# Patient Record
Sex: MALE | Race: BLACK OR AFRICAN AMERICAN | NOT HISPANIC OR LATINO | Employment: OTHER | URBAN - METROPOLITAN AREA
[De-identification: names, ages, dates, MRNs, and addresses within clinical notes are randomized per-mention and may not be internally consistent; named-entity substitution may affect disease eponyms.]

---

## 2017-11-24 ENCOUNTER — HOSPITAL ENCOUNTER (INPATIENT)
Facility: HOSPITAL | Age: 82
LOS: 2 days | Discharge: HOME | DRG: 871 | End: 2017-11-26
Attending: EMERGENCY MEDICINE | Admitting: INTERNAL MEDICINE
Payer: MEDICARE

## 2017-11-24 DIAGNOSIS — R53.81 MALAISE: ICD-10-CM

## 2017-11-24 DIAGNOSIS — N12 PYELONEPHRITIS: ICD-10-CM

## 2017-11-24 DIAGNOSIS — N30.00 ACUTE CYSTITIS WITHOUT HEMATURIA: Primary | ICD-10-CM

## 2017-11-24 DIAGNOSIS — N28.9 RENAL INSUFFICIENCY: ICD-10-CM

## 2017-11-24 PROBLEM — I10 ESSENTIAL HYPERTENSION: Chronic | Status: ACTIVE | Noted: 2017-11-24

## 2017-11-24 PROBLEM — N17.9 ACUTE RENAL FAILURE: Status: ACTIVE | Noted: 2017-11-24

## 2017-11-24 PROBLEM — Z86.73 HISTORY OF CVA (CEREBROVASCULAR ACCIDENT): Chronic | Status: ACTIVE | Noted: 2017-11-24

## 2017-11-24 PROBLEM — A41.9 SEPSIS: Status: ACTIVE | Noted: 2017-11-24

## 2017-11-24 LAB
ALBUMIN SERPL BCP-MCNC: 3.3 G/DL
ALP SERPL-CCNC: 61 U/L
ALT SERPL W/O P-5'-P-CCNC: 23 U/L
ANION GAP SERPL CALC-SCNC: 10 MMOL/L
ANION GAP SERPL CALC-SCNC: 14 MMOL/L
AST SERPL-CCNC: 22 U/L
BACTERIA #/AREA URNS HPF: ABNORMAL /HPF
BASOPHILS # BLD AUTO: 0.02 K/UL
BASOPHILS NFR BLD: 0.1 %
BILIRUB SERPL-MCNC: 1.4 MG/DL
BILIRUB UR QL STRIP: NEGATIVE
BUN SERPL-MCNC: 56 MG/DL
BUN SERPL-MCNC: 62 MG/DL
CALCIUM SERPL-MCNC: 8.8 MG/DL
CALCIUM SERPL-MCNC: 9.6 MG/DL
CHLORIDE SERPL-SCNC: 100 MMOL/L
CHLORIDE SERPL-SCNC: 105 MMOL/L
CLARITY UR: ABNORMAL
CO2 SERPL-SCNC: 21 MMOL/L
CO2 SERPL-SCNC: 23 MMOL/L
COLOR UR: YELLOW
CREAT SERPL-MCNC: 2.6 MG/DL
CREAT SERPL-MCNC: 3.2 MG/DL
DIFFERENTIAL METHOD: ABNORMAL
EOSINOPHIL # BLD AUTO: 0.2 K/UL
EOSINOPHIL NFR BLD: 1.3 %
ERYTHROCYTE [DISTWIDTH] IN BLOOD BY AUTOMATED COUNT: 13.9 %
EST. GFR  (AFRICAN AMERICAN): 19 ML/MIN/1.73 M^2
EST. GFR  (AFRICAN AMERICAN): 24 ML/MIN/1.73 M^2
EST. GFR  (NON AFRICAN AMERICAN): 16 ML/MIN/1.73 M^2
EST. GFR  (NON AFRICAN AMERICAN): 21 ML/MIN/1.73 M^2
FLUAV AG SPEC QL IA: NEGATIVE
FLUBV AG SPEC QL IA: NEGATIVE
GLUCOSE SERPL-MCNC: 83 MG/DL
GLUCOSE SERPL-MCNC: 89 MG/DL
GLUCOSE UR QL STRIP: NEGATIVE
HCT VFR BLD AUTO: 39.8 %
HGB BLD-MCNC: 13.4 G/DL
HGB UR QL STRIP: ABNORMAL
KETONES UR QL STRIP: NEGATIVE
LACTATE SERPL-SCNC: 0.9 MMOL/L
LACTATE SERPL-SCNC: 1 MMOL/L
LACTATE SERPL-SCNC: 1 MMOL/L
LEUKOCYTE ESTERASE UR QL STRIP: ABNORMAL
LYMPHOCYTES # BLD AUTO: 1.5 K/UL
LYMPHOCYTES NFR BLD: 8.4 %
MAGNESIUM SERPL-MCNC: 2.3 MG/DL
MCH RBC QN AUTO: 31.6 PG
MCHC RBC AUTO-ENTMCNC: 33.7 G/DL
MCV RBC AUTO: 94 FL
MICROSCOPIC COMMENT: ABNORMAL
MONOCYTES # BLD AUTO: 0.8 K/UL
MONOCYTES NFR BLD: 4.5 %
NEUTROPHILS # BLD AUTO: 14.8 K/UL
NEUTROPHILS NFR BLD: 85.7 %
NITRITE UR QL STRIP: POSITIVE
PH UR STRIP: 6 [PH] (ref 5–8)
PHOSPHATE SERPL-MCNC: 3.6 MG/DL
PLATELET # BLD AUTO: 120 K/UL
PMV BLD AUTO: 9.8 FL
POTASSIUM SERPL-SCNC: 3.8 MMOL/L
POTASSIUM SERPL-SCNC: 3.9 MMOL/L
PROT SERPL-MCNC: 8.1 G/DL
PROT UR QL STRIP: ABNORMAL
RBC # BLD AUTO: 4.24 M/UL
RBC #/AREA URNS HPF: 80 /HPF (ref 0–4)
SODIUM SERPL-SCNC: 136 MMOL/L
SODIUM SERPL-SCNC: 137 MMOL/L
SP GR UR STRIP: 1.02 (ref 1–1.03)
SPECIMEN SOURCE: NORMAL
TSH SERPL DL<=0.005 MIU/L-ACNC: 3.05 UIU/ML
URN SPEC COLLECT METH UR: ABNORMAL
UROBILINOGEN UR STRIP-ACNC: NEGATIVE EU/DL
WBC # BLD AUTO: 17.31 K/UL
WBC #/AREA URNS HPF: 40 /HPF (ref 0–5)
WBC CLUMPS URNS QL MICRO: ABNORMAL

## 2017-11-24 PROCEDURE — 96361 HYDRATE IV INFUSION ADD-ON: CPT

## 2017-11-24 PROCEDURE — 87040 BLOOD CULTURE FOR BACTERIA: CPT | Mod: 59

## 2017-11-24 PROCEDURE — 80048 BASIC METABOLIC PNL TOTAL CA: CPT

## 2017-11-24 PROCEDURE — 93010 ELECTROCARDIOGRAM REPORT: CPT | Mod: ,,, | Performed by: INTERNAL MEDICINE

## 2017-11-24 PROCEDURE — 25000003 PHARM REV CODE 250: Performed by: NURSE PRACTITIONER

## 2017-11-24 PROCEDURE — 84443 ASSAY THYROID STIM HORMONE: CPT

## 2017-11-24 PROCEDURE — 87086 URINE CULTURE/COLONY COUNT: CPT

## 2017-11-24 PROCEDURE — 96372 THER/PROPH/DIAG INJ SC/IM: CPT

## 2017-11-24 PROCEDURE — 93005 ELECTROCARDIOGRAM TRACING: CPT

## 2017-11-24 PROCEDURE — 83605 ASSAY OF LACTIC ACID: CPT | Mod: 91

## 2017-11-24 PROCEDURE — 63600175 PHARM REV CODE 636 W HCPCS: Performed by: EMERGENCY MEDICINE

## 2017-11-24 PROCEDURE — 25000003 PHARM REV CODE 250: Performed by: EMERGENCY MEDICINE

## 2017-11-24 PROCEDURE — 81000 URINALYSIS NONAUTO W/SCOPE: CPT

## 2017-11-24 PROCEDURE — 25000003 PHARM REV CODE 250: Performed by: FAMILY MEDICINE

## 2017-11-24 PROCEDURE — 63600175 PHARM REV CODE 636 W HCPCS: Performed by: NURSE PRACTITIONER

## 2017-11-24 PROCEDURE — 83605 ASSAY OF LACTIC ACID: CPT

## 2017-11-24 PROCEDURE — 87400 INFLUENZA A/B EACH AG IA: CPT | Mod: 59

## 2017-11-24 PROCEDURE — 96365 THER/PROPH/DIAG IV INF INIT: CPT

## 2017-11-24 PROCEDURE — S0030 INJECTION, METRONIDAZOLE: HCPCS | Performed by: FAMILY MEDICINE

## 2017-11-24 PROCEDURE — 11000001 HC ACUTE MED/SURG PRIVATE ROOM

## 2017-11-24 PROCEDURE — 36415 COLL VENOUS BLD VENIPUNCTURE: CPT

## 2017-11-24 PROCEDURE — 99285 EMERGENCY DEPT VISIT HI MDM: CPT | Mod: 25

## 2017-11-24 PROCEDURE — 96375 TX/PRO/DX INJ NEW DRUG ADDON: CPT

## 2017-11-24 PROCEDURE — 85025 COMPLETE CBC W/AUTO DIFF WBC: CPT

## 2017-11-24 PROCEDURE — 83735 ASSAY OF MAGNESIUM: CPT

## 2017-11-24 PROCEDURE — 84100 ASSAY OF PHOSPHORUS: CPT

## 2017-11-24 PROCEDURE — 80053 COMPREHEN METABOLIC PANEL: CPT

## 2017-11-24 RX ORDER — ONDANSETRON 2 MG/ML
4 INJECTION INTRAMUSCULAR; INTRAVENOUS EVERY 6 HOURS PRN
Status: DISCONTINUED | OUTPATIENT
Start: 2017-11-24 | End: 2017-11-26 | Stop reason: HOSPADM

## 2017-11-24 RX ORDER — ACETAMINOPHEN 325 MG/1
650 TABLET ORAL EVERY 8 HOURS PRN
Status: DISCONTINUED | OUTPATIENT
Start: 2017-11-24 | End: 2017-11-26 | Stop reason: HOSPADM

## 2017-11-24 RX ORDER — QUETIAPINE FUMARATE 25 MG/1
25 TABLET, FILM COATED ORAL NIGHTLY
Status: DISCONTINUED | OUTPATIENT
Start: 2017-11-24 | End: 2017-11-26 | Stop reason: HOSPADM

## 2017-11-24 RX ORDER — METRONIDAZOLE 500 MG/100ML
500 INJECTION, SOLUTION INTRAVENOUS
Status: DISCONTINUED | OUTPATIENT
Start: 2017-11-24 | End: 2017-11-25

## 2017-11-24 RX ORDER — ASPIRIN 81 MG/1
81 TABLET ORAL DAILY
Status: DISCONTINUED | OUTPATIENT
Start: 2017-11-24 | End: 2017-11-26 | Stop reason: HOSPADM

## 2017-11-24 RX ORDER — ACETAMINOPHEN 325 MG/1
650 TABLET ORAL
Status: COMPLETED | OUTPATIENT
Start: 2017-11-24 | End: 2017-11-24

## 2017-11-24 RX ORDER — CEFTRIAXONE 1 G/1
1 INJECTION, POWDER, FOR SOLUTION INTRAMUSCULAR; INTRAVENOUS
Status: DISCONTINUED | OUTPATIENT
Start: 2017-11-24 | End: 2017-11-24 | Stop reason: RX

## 2017-11-24 RX ORDER — CIPROFLOXACIN 2 MG/ML
400 INJECTION, SOLUTION INTRAVENOUS
Status: COMPLETED | OUTPATIENT
Start: 2017-11-24 | End: 2017-11-24

## 2017-11-24 RX ORDER — PANTOPRAZOLE SODIUM 40 MG/1
40 TABLET, DELAYED RELEASE ORAL DAILY
Status: DISCONTINUED | OUTPATIENT
Start: 2017-11-24 | End: 2017-11-26 | Stop reason: HOSPADM

## 2017-11-24 RX ORDER — ATORVASTATIN CALCIUM 40 MG/1
40 TABLET, FILM COATED ORAL DAILY
Status: DISCONTINUED | OUTPATIENT
Start: 2017-11-24 | End: 2017-11-26 | Stop reason: HOSPADM

## 2017-11-24 RX ORDER — HEPARIN SODIUM 5000 [USP'U]/ML
5000 INJECTION, SOLUTION INTRAVENOUS; SUBCUTANEOUS EVERY 8 HOURS
Status: DISCONTINUED | OUTPATIENT
Start: 2017-11-24 | End: 2017-11-26 | Stop reason: HOSPADM

## 2017-11-24 RX ORDER — ENOXAPARIN SODIUM 100 MG/ML
30 INJECTION SUBCUTANEOUS EVERY 24 HOURS
Status: DISCONTINUED | OUTPATIENT
Start: 2017-11-24 | End: 2017-11-24

## 2017-11-24 RX ORDER — AMLODIPINE BESYLATE 5 MG/1
5 TABLET ORAL DAILY
Status: DISCONTINUED | OUTPATIENT
Start: 2017-11-24 | End: 2017-11-26 | Stop reason: HOSPADM

## 2017-11-24 RX ORDER — CIPROFLOXACIN 2 MG/ML
400 INJECTION, SOLUTION INTRAVENOUS
Status: CANCELLED | OUTPATIENT
Start: 2017-11-25

## 2017-11-24 RX ORDER — SODIUM CHLORIDE 9 MG/ML
INJECTION, SOLUTION INTRAVENOUS CONTINUOUS
Status: DISCONTINUED | OUTPATIENT
Start: 2017-11-24 | End: 2017-11-26 | Stop reason: HOSPADM

## 2017-11-24 RX ADMIN — SODIUM CHLORIDE: 0.9 INJECTION, SOLUTION INTRAVENOUS at 09:11

## 2017-11-24 RX ADMIN — CIPROFLOXACIN 400 MG: 2 INJECTION, SOLUTION INTRAVENOUS at 04:11

## 2017-11-24 RX ADMIN — PANTOPRAZOLE SODIUM 40 MG: 40 TABLET, DELAYED RELEASE ORAL at 09:11

## 2017-11-24 RX ADMIN — CEFTRIAXONE SODIUM 1 G: 1 INJECTION, POWDER, FOR SOLUTION INTRAMUSCULAR; INTRAVENOUS at 06:11

## 2017-11-24 RX ADMIN — HEPARIN SODIUM 5000 UNITS: 5000 INJECTION, SOLUTION INTRAVENOUS; SUBCUTANEOUS at 10:11

## 2017-11-24 RX ADMIN — SODIUM CHLORIDE 1000 ML: 0.9 INJECTION, SOLUTION INTRAVENOUS at 03:11

## 2017-11-24 RX ADMIN — ASPIRIN 81 MG: 81 TABLET, COATED ORAL at 09:11

## 2017-11-24 RX ADMIN — AMLODIPINE BESYLATE 5 MG: 5 TABLET ORAL at 11:11

## 2017-11-24 RX ADMIN — SODIUM CHLORIDE 1000 ML: 0.9 INJECTION, SOLUTION INTRAVENOUS at 06:11

## 2017-11-24 RX ADMIN — ATORVASTATIN CALCIUM 40 MG: 40 TABLET, FILM COATED ORAL at 09:11

## 2017-11-24 RX ADMIN — HEPARIN SODIUM 5000 UNITS: 5000 INJECTION, SOLUTION INTRAVENOUS; SUBCUTANEOUS at 02:11

## 2017-11-24 RX ADMIN — ACETAMINOPHEN 650 MG: 325 TABLET ORAL at 03:11

## 2017-11-24 RX ADMIN — QUETIAPINE FUMARATE 25 MG: 25 TABLET, FILM COATED ORAL at 07:11

## 2017-11-24 RX ADMIN — METRONIDAZOLE 500 MG: 500 INJECTION, SOLUTION INTRAVENOUS at 05:11

## 2017-11-24 NOTE — PLAN OF CARE
Problem: Patient Care Overview  Goal: Plan of Care Review  Outcome: Ongoing (interventions implemented as appropriate)  Patient remains free from injury. Denies pain. Patient turning and repositioning in bed. Sitter at the bedside. Fluids being administered as ordered. 12 hour chart check complete. Will continue to monitor.

## 2017-11-24 NOTE — ED PROVIDER NOTES
SCRIBE #1 NOTE: I, Pedro Fenton, am scribing for, and in the presence of, Brent Middleton MD. I have scribed the entire note.      History      Chief Complaint   Patient presents with    Altered Mental Status     transfer from [facility],        Review of patient's allergies indicates:  No Known Allergies     HPI   HPI    11/24/2017, 2:40 AM   History obtained from the Transferring hospital      History of Present Illness: Guido Michaels is a 88 y.o. male patient who presents to the Emergency Department for an evaluation of AMS. Pt was transferred from [facility] to evaluated for his garbled speech and gait problem by a neurologist. Symptoms are constant and moderate in severity. Exacerbated by nothing and relieved by nothing. Associated sxs include confusion. Pt  Reportedly has a hx of dementia.       Arrival mode:  EMS     PCP: Vinicio Walter MD (Inactive)       Past Medical History:  Past Medical History:   Diagnosis Date    Dementia     Diastolic dysfunction without heart failure 2014    History of ischemic stroke without residual deficits 2014    s/p tPA therapy    Hyperlipidemia     Hypertension        Past Surgical History:  Past surgical history reviewed not relevant      Family History:  Family history reviewed not relevant      Social History:  Social History    Social History Main Topics    Social History Main Topics    Smoking status: Unknown if ever smoked    Smokeless tobacco: Unknown if ever used    Alcohol Use: Unknown drinking history    Drug Use: Unknown if ever used    Sexual Activity: Unknown         ROS   Review of Systems   Unable to perform ROS: Dementia   Psychiatric/Behavioral: Positive for confusion.     Physical Exam      Initial Vitals   BP Pulse Resp Temp SpO2   -- -- -- -- --      MAP       --          Physical Exam  Nursing Notes and Vital Signs Reviewed.  Constitutional: Patient is in no acute distress. Well-developed and well-nourished.  Head: Atraumatic.  "Normocephalic.  Eyes: PERRL. EOM intact. Conjunctivae are not pale. No scleral icterus.  ENT: Mucous membranes are moist. Oropharynx is clear and symmetric.    Neck: Supple. Full ROM. No lymphadenopathy.  Cardiovascular: Regular rate. Regular rhythm.  Pulmonary/Chest: No respiratory distress. Clear to auscultation bilaterally. No wheezing or rales.  Abdominal: Soft and non-distended.   Musculoskeletal: Moves all extremities. No obvious deformities.  Skin: Warm and dry.  Neurological:  Alert, awake, and appropriate. Normal speech. Confused. Disoriented.   Psychiatric: Normal affect. Good eye contact. Appropriate in content.    ED Course    Procedures  ED Vital Signs:  Vitals:    11/24/17 0240 11/24/17 0302 11/24/17 0333 11/24/17 0454   BP: (!) 140/87 (!) 157/89 132/73 136/75   Pulse: 80 84 87 83   Resp: 18 19 20 18   Temp: (!) 100.7 °F (38.2 °C)   99.1 °F (37.3 °C)   TempSrc: Oral      SpO2: 98% 98% 98% 96%   Weight: 71 kg (156 lb 9.6 oz)      Height: 5' 9" (1.753 m)       11/24/17 0532 11/24/17 0730 11/24/17 0751 11/24/17 1219   BP: 137/79 (!) 150/91 (!) 150/91 (!) 178/86   Pulse: 81 73 73 86   Resp: 18 16 18   Temp:   99.7 °F (37.6 °C) 99.1 °F (37.3 °C)   TempSrc:   Oral Oral   SpO2: 100% 98%  97%   Weight:       Height:        11/24/17 2009 11/25/17 0035 11/25/17 0349   BP: (!) 181/92 (!) 160/99 (!) 173/99   Pulse: 73 (!) 55 79   Resp: 18 16 18   Temp: 98.5 °F (36.9 °C) 97.8 °F (36.6 °C) 97.8 °F (36.6 °C)   TempSrc:  Axillary Axillary   SpO2: 97% 98% 99%   Weight:      Height:          Abnormal Lab Results:  Labs Reviewed   CBC W/ AUTO DIFFERENTIAL - Abnormal; Notable for the following:        Result Value    WBC 17.31 (*)     RBC 4.24 (*)     Hemoglobin 13.4 (*)     Hematocrit 39.8 (*)     MCH 31.6 (*)     Platelets 120 (*)     Gran # 14.8 (*)     Gran% 85.7 (*)     Lymph% 8.4 (*)     All other components within normal limits   COMPREHENSIVE METABOLIC PANEL - Abnormal; Notable for the following:     BUN, Bld 62 " (*)     Creatinine 3.2 (*)     Albumin 3.3 (*)     Total Bilirubin 1.4 (*)     eGFR if  19 (*)     eGFR if non  16 (*)     All other components within normal limits   URINALYSIS - Abnormal; Notable for the following:     Appearance, UA Cloudy (*)     Protein, UA Trace (*)     Occult Blood UA 3+ (*)     Nitrite, UA Positive (*)     Leukocytes, UA 1+ (*)     All other components within normal limits   URINALYSIS MICROSCOPIC - Abnormal; Notable for the following:     RBC, UA 80 (*)     WBC, UA 40 (*)     WBC Clumps, UA Occasional (*)     Bacteria, UA Many (*)     All other components within normal limits   CULTURE, URINE   TSH   MAGNESIUM   PHOSPHORUS   INFLUENZA A AND B ANTIGEN   LACTIC ACID, PLASMA   LACTIC ACID, PLASMA        All Lab Results:  Results for orders placed or performed during the hospital encounter of 11/24/17   Blood culture   Result Value Ref Range    Blood Culture, Routine No Growth to date    Blood culture   Result Value Ref Range    Blood Culture, Routine No Growth to date    CBC auto differential   Result Value Ref Range    WBC 17.31 (H) 3.90 - 12.70 K/uL    RBC 4.24 (L) 4.60 - 6.20 M/uL    Hemoglobin 13.4 (L) 14.0 - 18.0 g/dL    Hematocrit 39.8 (L) 40.0 - 54.0 %    MCV 94 82 - 98 fL    MCH 31.6 (H) 27.0 - 31.0 pg    MCHC 33.7 32.0 - 36.0 g/dL    RDW 13.9 11.5 - 14.5 %    Platelets 120 (L) 150 - 350 K/uL    MPV 9.8 9.2 - 12.9 fL    Gran # 14.8 (H) 1.8 - 7.7 K/uL    Lymph # 1.5 1.0 - 4.8 K/uL    Mono # 0.8 0.3 - 1.0 K/uL    Eos # 0.2 0.0 - 0.5 K/uL    Baso # 0.02 0.00 - 0.20 K/uL    Gran% 85.7 (H) 38.0 - 73.0 %    Lymph% 8.4 (L) 18.0 - 48.0 %    Mono% 4.5 4.0 - 15.0 %    Eosinophil% 1.3 0.0 - 8.0 %    Basophil% 0.1 0.0 - 1.9 %    Differential Method Automated    Comprehensive metabolic panel   Result Value Ref Range    Sodium 137 136 - 145 mmol/L    Potassium 3.8 3.5 - 5.1 mmol/L    Chloride 100 95 - 110 mmol/L    CO2 23 23 - 29 mmol/L    Glucose 89 70 - 110 mg/dL     BUN, Bld 62 (H) 8 - 23 mg/dL    Creatinine 3.2 (H) 0.5 - 1.4 mg/dL    Calcium 9.6 8.7 - 10.5 mg/dL    Total Protein 8.1 6.0 - 8.4 g/dL    Albumin 3.3 (L) 3.5 - 5.2 g/dL    Total Bilirubin 1.4 (H) 0.1 - 1.0 mg/dL    Alkaline Phosphatase 61 55 - 135 U/L    AST 22 10 - 40 U/L    ALT 23 10 - 44 U/L    Anion Gap 14 8 - 16 mmol/L    eGFR if African American 19 (A) >60 mL/min/1.73 m^2    eGFR if non African American 16 (A) >60 mL/min/1.73 m^2   Urinalysis   Result Value Ref Range    Specimen UA Urine, Catheterized     Color, UA Yellow Yellow, Straw, Carissa    Appearance, UA Cloudy (A) Clear    pH, UA 6.0 5.0 - 8.0    Specific Gravity, UA 1.020 1.005 - 1.030    Protein, UA Trace (A) Negative    Glucose, UA Negative Negative    Ketones, UA Negative Negative    Bilirubin (UA) Negative Negative    Occult Blood UA 3+ (A) Negative    Nitrite, UA Positive (A) Negative    Urobilinogen, UA Negative <2.0 EU/dL    Leukocytes, UA 1+ (A) Negative   TSH   Result Value Ref Range    TSH 3.054 0.400 - 4.000 uIU/mL   Magnesium   Result Value Ref Range    Magnesium 2.3 1.6 - 2.6 mg/dL   Phosphorus   Result Value Ref Range    Phosphorus 3.6 2.7 - 4.5 mg/dL   Influenza antigen Nasopharyngeal Swab   Result Value Ref Range    Influenza A Ag, EIA Negative Negative    Influenza B Ag, EIA Negative Negative    Flu A & B Source Nasopharyngeal Swab    Lactic acid, plasma   Result Value Ref Range    Lactate (Lactic Acid) 1.0 0.5 - 2.2 mmol/L   Urinalysis Microscopic   Result Value Ref Range    RBC, UA 80 (H) 0 - 4 /hpf    WBC, UA 40 (H) 0 - 5 /hpf    WBC Clumps, UA Occasional (A) None-Rare    Bacteria, UA Many (A) None-Occ /hpf    Microscopic Comment SEE COMMENT    Lactic acid, plasma   Result Value Ref Range    Lactate (Lactic Acid) 1.0 0.5 - 2.2 mmol/L   Lactic acid, plasma   Result Value Ref Range    Lactate (Lactic Acid) 0.9 0.5 - 2.2 mmol/L   Basic metabolic panel   Result Value Ref Range    Sodium 136 136 - 145 mmol/L    Potassium 3.9 3.5 - 5.1  mmol/L    Chloride 105 95 - 110 mmol/L    CO2 21 (L) 23 - 29 mmol/L    Glucose 83 70 - 110 mg/dL    BUN, Bld 56 (H) 8 - 23 mg/dL    Creatinine 2.6 (H) 0.5 - 1.4 mg/dL    Calcium 8.8 8.7 - 10.5 mg/dL    Anion Gap 10 8 - 16 mmol/L    eGFR if African American 24 (A) >60 mL/min/1.73 m^2    eGFR if non African American 21 (A) >60 mL/min/1.73 m^2         Imaging Results:  Imaging Results          US Retroperitoneal Complete (Kidney and (Final result)  Result time 11/24/17 09:37:47    Final result by Samuel Grant MD (11/24/17 09:37:47)                 Impression:     Mild right-sided hydronephrosis. Bilateral cystic kidney disease.          Electronically signed by: SAMUEL GRANT MD  Date:     11/24/17  Time:    09:37              Narrative:    Comparison:  6/20/14    History:  Acute kidney injury    Findings:    The kidneys are normal in size bilaterally measuring 9.3 cm on the right and 11.2 cm on the left. Mild right-sided hydronephrosis. Bilateral renal cysts are noted in the largest within the lower pole the right kidney measuring up to 6.7 cm. Largest cyst within the left kidney measures 1.5 cm. There is no evidence of nephrolithiasis. Adequate profusion is noted. Limited images of the urinary bladder are unremarkable.                             CT Head Without Contrast (Final result)  Result time 11/24/17 07:38:32    Final result by dEward Garzon III, MD (11/24/17 07:38:32)                 Impression:       1.  No acute intracranial disease identified.    2.  Age related atrophy.  Extensive chronic microvascular ischemic change.  Stable small chronic lacunar infarct in the left cerebellum.        Electronically signed by: EDWARD GARZON MD  Date:     11/24/17  Time:    07:38              Narrative:    Head CT without contrast    Clinical history: Altered mental status.  History of dementia.  r/o CVA    TECHNIQUE: Routine noncontrast axial head CT. All CT scans at this facility use dose modulation, iterative  reconstruction, and/or weight based dosing when appropriate to reduce radiation dose to as low as reasonably achievable.    Comparison: MRI 06/19/2014    FINDINGS: There is persistent diffuse age-related atrophy with commensurate expansion of the ventricles.  Atherosclerotic calcifications of the intracranial arteries are noted. There is persistent moderate to severe bihemispheric white matter hypodensity, most consistent with chronic microvascular ischemic change.  Small chronic lacunar infarct of the left cerebellum is again noted.  There is no CT evidence of acute cortical ischemia, intracranial hemorrhage, mass-effect,  obstructive hydrocephalus or other acute disease. The visualized paranasal sinuses and mastoid air cells are clear. No calvarial fracture is identified.                             X-Ray Chest 1 View (Final result)  Result time 11/24/17 07:35:03    Final result by Samuel Grant MD (11/24/17 07:35:03)                 Impression:     Atelectasis or airspace disease versus aspiration left lower lobe.       Electronically signed by: SAMUEL GRANT MD  Date:     11/24/17  Time:    07:35              Narrative:    Clinical Data:50    Comparison:  none    Findings:  Single view of the chest.      Cardiac silhouette is normal.  Atelectasis or airspace disease versus aspiration left lower lobe. Trace bilateral pleural effusions are not excluded..  No evidence of pneumothorax.  Bones demonstrate degenerative changes.                                  The EKG was ordered, reviewed, and independently interpreted by the ED provider.  Interpretation time: 3:17  Rate: 82 BPM  Rhythm: Sinus rhythm with 1st degree AV block  Interpretation: Minimal voltage criteria for LVH. Septal infarct. No STEMI.    The Emergency Provider reviewed the vital signs and test results, which are outlined above.    ED Discussion     4:39 AM: Discussed case with Dr. Santos ([facility]). Dr. Santos agrees with current care and  management of pt and accepts admission.   Admitting Service: Hospital medicine   Admitting Physician: Dr. Santos  Admit to: Med/surg    4:39 AM: Re-evaluated pt. I have discussed test results, shared treatment plan, and the need for admission with patient. Pt expresses understanding at this time and agree with all information. All questions answered. Pt has no further questions or concerns at this time. Pt is ready for admit.      ED Medication(s):  Medications   aspirin EC tablet 81 mg (81 mg Oral Given 11/24/17 0907)   atorvastatin tablet 40 mg (40 mg Oral Given 11/24/17 0907)   0.9%  NaCl infusion ( Intravenous New Bag 11/24/17 0907)   acetaminophen tablet 650 mg (not administered)   ondansetron injection 4 mg (not administered)   pantoprazole EC tablet 40 mg (40 mg Oral Given 11/24/17 0907)   cefTRIAXone (ROCEPHIN) 1 g in dextrose 5 % 50 mL IVPB (not administered)   heparin (porcine) injection 5,000 Units (5,000 Units Subcutaneous Given 11/24/17 2212)   metronidazole IVPB 500 mg (500 mg Intravenous New Bag 11/25/17 0307)   pneumoc 13-ivelisse conj-dip cr(PF) 0.5 mL (not administered)   influenza (FLUZONE HIGH-DOSE) vaccine 0.5 mL (not administered)   QUEtiapine tablet 25 mg (25 mg Oral Given 11/24/17 1901)   amLODIPine tablet 5 mg (5 mg Oral Given 11/24/17 2359)   hydrALAZINE injection 10 mg (not administered)   sodium chloride 0.9% bolus 1,000 mL (0 mLs Intravenous Stopped 11/24/17 0539)   acetaminophen tablet 650 mg (650 mg Oral Given 11/24/17 0331)   ciprofloxacin (CIPRO)400mg/200ml D5W IVPB 400 mg (0 mg Intravenous Stopped 11/24/17 0505)   sodium chloride 0.9% bolus 1,000 mL (1,000 mLs Intravenous New Bag 11/24/17 0600)       Current Discharge Medication List                Medical Decision Making    Medical Decision Making:   Clinical Tests:   Lab Tests: Ordered and Reviewed  Radiological Study: Ordered and Reviewed  Medical Tests: Ordered and Reviewed           Scribe Attestation:   Scribe #1: I performed the  above scribed service and the documentation accurately describes the services I performed. I attest to the accuracy of the note.    Attending:   Physician Attestation Statement for Scribe #1: I, Brent Middleton,*, personally performed the services described in this documentation, as scribed by Pedro Fenton, in my presence, and it is both accurate and complete.          Clinical Impression       ICD-10-CM ICD-9-CM   1. Pyelonephritis N12 590.80   2. Malaise R53.81 780.79   3. Renal insufficiency N28.9 593.9       Disposition:   Disposition: Admitted  Condition: Fair         Brent Middleton MD  11/25/17 0457

## 2017-11-24 NOTE — PLAN OF CARE
D/C assessment initiated with pt, but information given limited due to pt hx of dementia.  The pt was sent from [facility] for further treatment and evaluation due to altered mental status.  During assessment pt reports that he lives at home with his spouse, Kay Michaels, who provides assistance as needed.  He denies using any hme, The pt believes he came to the hospital for stomach pain.  CM attempted to contact the pt's family member listed on demographics, but no one answered the phone.  CM to continue to follow pt and assess for d/c needs.        11/24/17 1252   Discharge Assessment   Assessment Type Discharge Planning Assessment   Confirmed/corrected address and phone number on facesheet? Yes   Assessment information obtained from? Patient;Medical Record   Expected Length of Stay (days) (TBD)   Prior to hospitilization cognitive status: Unable to Assess   Prior to hospitalization functional status: Independent   Current cognitive status: Not Oriented to Person   Current Functional Status: Independent   Lives With spouse   Able to Return to Prior Arrangements yes   Is patient able to care for self after discharge? Yes   Who are your caregiver(s) and their phone number(s)? Makenzie Skelton, grandchild: [phone]   Patient's perception of discharge disposition home or selfcare   Readmission Within The Last 30 Days no previous admission in last 30 days   Patient currently being followed by outpatient case management? No   Patient currently receives any other outside agency services? No   Equipment Currently Used at Home none   Do you have any problems affording any of your prescribed medications? TBD   Does the patient have transportation home? Yes   Transportation Available family or friend will provide   Discharge Plan A Home with family   Discharge Plan B Home with family   Patient/Family In Agreement With Plan yes

## 2017-11-24 NOTE — ASSESSMENT & PLAN NOTE
- Likely due to above.  - Neuro checks.  - CT head to r/o acute CVA given documented c/o slurred speech.  No focal deficits appreciated currently.

## 2017-11-24 NOTE — H&P
"[facility] Hospital Medicine  History & Physical    Patient Name: Guido Michaels  MRN: [id number]  Admission Date: 11/24/2017  Attending Physician: Raad Santos MD   Primary Care Provider: Vinicio Walter MD         Patient information was obtained from past medical records, [facility] ER records and ER records.     Subjective:     Principal Problem:Pyelonephritis    Chief Complaint:   Chief Complaint   Patient presents with    Altered Mental Status     transfer from [facility],         HPI: History obtained from medical record due to patient's dementia.  Mr. Michaels is an 89yo male  with a PMHx of HTN, dementia, and h/o ischemic CVA without deficits in 2014, who originally presented to [facility] ED with c/o AMS.  Per family, patient is more confused than baseline with associated "inconprehenible speech, gait problem, and generalized weakness.  Patient was transferred to [facility] ED for higher level of care.  Initial work-up in ED resulted WBC 17.3, cr. 3.2, BUN 62, lactic 1, UA consistent with UTI.  Patient hemodynamically stable; Temp 100.7.  Hospital Medicine consulted for admission.  Patient has no documented h/o kidney disease; Last documented cr. of 1.1 in 2014.  Currently, patient is awake and alert, oriented to person.  Denies any complaints.  No slurred speech or weakness/hemiparesis appreciated.         Past Medical History:   Diagnosis Date    Dementia     Diastolic dysfunction without heart failure 2014    History of ischemic stroke without residual deficits 2014    s/p tPA therapy    Hyperlipidemia     Hypertension        Past Surgical History:   Procedure Laterality Date    APPENDECTOMY      HERNIA REPAIR         Review of patient's allergies indicates:  No Known Allergies    No current facility-administered medications on file prior to encounter.      Current Outpatient Prescriptions on File Prior to Encounter   Medication Sig    aspirin (ECOTRIN) 81 MG EC tablet Take 1 tablet (81 mg total) by " mouth once daily.    atorvastatin (LIPITOR) 40 MG tablet Take 1 tablet (40 mg total) by mouth once daily.    hydrochlorothiazide (HYDRODIURIL) 25 MG tablet Take 1 tablet (25 mg total) by mouth once daily.    lisinopril (PRINIVIL,ZESTRIL) 20 MG tablet Take 1 tablet (20 mg total) by mouth once daily.     Family History     Reviewed and not pertinent.         Social History Main Topics    Smoking status: Never Smoker    Smokeless tobacco: Never Used    Alcohol use No    Drug use: No    Sexual activity: Not on file     Review of Systems   Unable to perform ROS: Mental status change     Objective:     Vital Signs (Most Recent):  Temp: 99.1 °F (37.3 °C) (11/24/17 0454)  Pulse: 81 (11/24/17 0532)  Resp: 18 (11/24/17 0532)  BP: 137/79 (11/24/17 0532)  SpO2: 100 % (11/24/17 0532) Vital Signs (24h Range):  Temp:  [99.1 °F (37.3 °C)-100.7 °F (38.2 °C)] 99.1 °F (37.3 °C)  Pulse:  [80-87] 81  Resp:  [18-20] 18  SpO2:  [96 %-100 %] 100 %  BP: (132-157)/(73-89) 137/79     Weight: 71 kg (156 lb 9.6 oz)  Body mass index is 23.13 kg/m².    Physical Exam   Constitutional: Vital signs are normal. He appears well-developed. He is cooperative.   Elderly, frail.   HENT:   Head: Normocephalic and atraumatic.   Eyes: Conjunctivae and EOM are normal. Pupils are equal, round, and reactive to light.   Neck: Normal range of motion. Neck supple. No JVD present.   Cardiovascular: Normal rate, regular rhythm, S1 normal, S2 normal, intact distal pulses and normal pulses.   No extrasystoles are present. Exam reveals no gallop.    No murmur heard.  Pulses:       Radial pulses are 2+ on the right side, and 2+ on the left side.        Dorsalis pedis pulses are 2+ on the right side, and 2+ on the left side.        Posterior tibial pulses are 2+ on the right side, and 2+ on the left side.   Pulmonary/Chest: Effort normal and breath sounds normal. No accessory muscle usage. No tachypnea. No respiratory distress. He has no wheezes. He has no  rhonchi. He has no rales.   Abdominal: Soft. Normal appearance and bowel sounds are normal. He exhibits no distension. There is no tenderness. There is no rebound, no guarding and no CVA tenderness.   Musculoskeletal: Normal range of motion. He exhibits no edema, tenderness or deformity.   Neurological: He is alert. He is disoriented. GCS eye subscore is 4. GCS verbal subscore is 4. GCS motor subscore is 6.   No focal deficits.   Skin: Skin is warm and dry. Capillary refill takes 2 to 3 seconds. No rash noted. No erythema.   Poor skin turgor.    Psychiatric: He has a normal mood and affect. His speech is normal. Cognition and memory are impaired (dementia).   Nursing note and vitals reviewed.    Tissue Perfusion Assessment  Vital signs reviewed. A focused perfusion assessment was completed.         Significant Labs:   Results for orders placed or performed during the hospital encounter of 11/24/17   CBC auto differential   Result Value Ref Range    WBC 17.31 (H) 3.90 - 12.70 K/uL    RBC 4.24 (L) 4.60 - 6.20 M/uL    Hemoglobin 13.4 (L) 14.0 - 18.0 g/dL    Hematocrit 39.8 (L) 40.0 - 54.0 %    MCV 94 82 - 98 fL    MCH 31.6 (H) 27.0 - 31.0 pg    MCHC 33.7 32.0 - 36.0 g/dL    RDW 13.9 11.5 - 14.5 %    Platelets 120 (L) 150 - 350 K/uL    MPV 9.8 9.2 - 12.9 fL    Gran # 14.8 (H) 1.8 - 7.7 K/uL    Lymph # 1.5 1.0 - 4.8 K/uL    Mono # 0.8 0.3 - 1.0 K/uL    Eos # 0.2 0.0 - 0.5 K/uL    Baso # 0.02 0.00 - 0.20 K/uL    Gran% 85.7 (H) 38.0 - 73.0 %    Lymph% 8.4 (L) 18.0 - 48.0 %    Mono% 4.5 4.0 - 15.0 %    Eosinophil% 1.3 0.0 - 8.0 %    Basophil% 0.1 0.0 - 1.9 %    Differential Method Automated    Comprehensive metabolic panel   Result Value Ref Range    Sodium 137 136 - 145 mmol/L    Potassium 3.8 3.5 - 5.1 mmol/L    Chloride 100 95 - 110 mmol/L    CO2 23 23 - 29 mmol/L    Glucose 89 70 - 110 mg/dL    BUN, Bld 62 (H) 8 - 23 mg/dL    Creatinine 3.2 (H) 0.5 - 1.4 mg/dL    Calcium 9.6 8.7 - 10.5 mg/dL    Total Protein 8.1 6.0 -  8.4 g/dL    Albumin 3.3 (L) 3.5 - 5.2 g/dL    Total Bilirubin 1.4 (H) 0.1 - 1.0 mg/dL    Alkaline Phosphatase 61 55 - 135 U/L    AST 22 10 - 40 U/L    ALT 23 10 - 44 U/L    Anion Gap 14 8 - 16 mmol/L    eGFR if African American 19 (A) >60 mL/min/1.73 m^2    eGFR if non African American 16 (A) >60 mL/min/1.73 m^2   Urinalysis   Result Value Ref Range    Specimen UA Urine, Catheterized     Color, UA Yellow Yellow, Straw, Carissa    Appearance, UA Cloudy (A) Clear    pH, UA 6.0 5.0 - 8.0    Specific Gravity, UA 1.020 1.005 - 1.030    Protein, UA Trace (A) Negative    Glucose, UA Negative Negative    Ketones, UA Negative Negative    Bilirubin (UA) Negative Negative    Occult Blood UA 3+ (A) Negative    Nitrite, UA Positive (A) Negative    Urobilinogen, UA Negative <2.0 EU/dL    Leukocytes, UA 1+ (A) Negative   TSH   Result Value Ref Range    TSH 3.054 0.400 - 4.000 uIU/mL   Magnesium   Result Value Ref Range    Magnesium 2.3 1.6 - 2.6 mg/dL   Phosphorus   Result Value Ref Range    Phosphorus 3.6 2.7 - 4.5 mg/dL   Influenza antigen Nasopharyngeal Swab   Result Value Ref Range    Influenza A Ag, EIA Negative Negative    Influenza B Ag, EIA Negative Negative    Flu A & B Source Nasopharyngeal Swab    Lactic acid, plasma   Result Value Ref Range    Lactate (Lactic Acid) 1.0 0.5 - 2.2 mmol/L   Urinalysis Microscopic   Result Value Ref Range    RBC, UA 80 (H) 0 - 4 /hpf    WBC, UA 40 (H) 0 - 5 /hpf    WBC Clumps, UA Occasional (A) None-Rare    Bacteria, UA Many (A) None-Occ /hpf    Microscopic Comment SEE COMMENT       All pertinent labs within the past 24 hours have been reviewed.    Significant Imaging:   Imaging Results          X-Ray Chest 1 View (Preliminary result)  Result time 11/24/17 03:53:15    ED Interpretation by Brent Middleton MD (11/24/17 03:53:15, [facility], Emergency Medicine)    No acute finding                             I have reviewed all pertinent imaging results/findings within  the past 24 hours.     EKG: (personally reviewed)  Sinus rhythm with 1st degree AV block, LVH.  No acute ischemic ST-T abnormalities.  No previous to compare.        Assessment/Plan:     * Pyelonephritis    - Urine culture pending.  - Empiric abx with IV Rocephin.  - Aggressive IVF's.  - Strict I&O's.        Acute renal failure    - Likely due to above + IVVD + ACEi/HCTZ.  - Will get renal U/S.  - Aggressive IV hydration with 0.9% NS @ 125 mL/hr.  - Hold home ACEi and HCTZ.  - Avoid nephrotoxic agents.  - Strict I&O's.  - Follow kidney function closely.  Will consult Renal if needed.        Sepsis    - Likely due to above.  - Patient remains hemodynamically stable; Lactic 1.  - IV fluid resuscitation initiated in ED.  - Continuous IVF's with 0.9% NS @ 125 mL/hr.  - Blood and urine cultures pending.  - Empiric IV abx as above.  - Follow serial lactic; Daily labs.  - Antipyretics and antiemetics PRN.        Encephalopathy, metabolic    - Likely due to above.  - Neuro checks.  - CT head to r/o acute CVA given documented c/o slurred speech.  No focal deficits appreciated currently.        History of ischemic stroke without residual deficits    - Resume home ASA and statin.        Essential hypertension    - Hold home ACEi and HCTZ due to NICOLE.  - Will hold off on adding antihypertensives for now to prevent hypotension given sepsis.  Monitor BP trends, and add if needed.          VTE Risk Mitigation     Lovenox and SCD's.             RADHA Coyle  Department of Hospital Medicine   [facility]

## 2017-11-24 NOTE — ASSESSMENT & PLAN NOTE
- Likely due to above.  - Patient remains hemodynamically stable; Lactic 1.  - IV fluid resuscitation initiated in ED.  - Continuous IVF's with 0.9% NS @ 125 mL/hr.  - Blood and urine cultures pending.  - Empiric IV abx as above.  - Follow serial lactic; Daily labs.  - Antipyretics and antiemetics PRN.

## 2017-11-24 NOTE — ED NOTES
In and out cath performed per order to obtain urine sample. Sterile technique used Pt tolerated well.

## 2017-11-24 NOTE — PROGRESS NOTES
Pt admitted from [facility] at 6 AM this morning.    He is being treated/evaluated for metabolic encephalopathy, sepsis, NICOLE and Pyelonephritis. Pt has a hx of Dementia.    There is a sitter present in the room    Pt is awake and sitting on side of bed. His speech is clear and disoriented to person only. He has no focal deficits and sitter states he has ambulated to the bathroom.     Vital signs are stable, IV fluids and IV Rocephin in progress. Renal US indicated mild right sided hydronephrosis and bilateral cystic kidney disease. Lactic acid is normal. Blood cultures and urine culture pending.    Pt reports intermitted abdominal pain, denies back pain and hematuria.     Physical Exam   Constitutional: He appears well-developed.   HENT:   Head: Normocephalic and atraumatic.   Eyes: Conjunctivae are normal. No scleral icterus.   Neck: Normal range of motion. Neck supple.   Cardiovascular: Normal rate, regular rhythm and normal heart sounds.  Exam reveals no gallop and no friction rub.    No murmur heard.  Pulmonary/Chest: Effort normal and breath sounds normal.   Abdominal: Soft. Bowel sounds are normal.   Musculoskeletal: Normal range of motion. He exhibits no edema or tenderness.   Neurological: He is alert. He is disoriented.   Skin: Skin is warm and dry.   Psychiatric: He has a normal mood and affect. His behavior is normal.   Nursing note and vitals reviewed.

## 2017-11-24 NOTE — ED NOTES
Spoke to pt's wife Teresa Michaels per pt's request; states she is unable to come to the hospital at this time as she does not drive out of town.  Pt's wife updated on room assignment.  Pt's wife states she is trying to find a family member who can come be with the pt in the hospital at this time.

## 2017-11-24 NOTE — ASSESSMENT & PLAN NOTE
- Likely due to above + IVVD + ACEi/HCTZ.  - Will get renal U/S.  - Aggressive IV hydration with 0.9% NS @ 125 mL/hr.  - Hold home ACEi and HCTZ.  - Avoid nephrotoxic agents.  - Strict I&O's.  - Follow kidney function closely.  Will consult Renal if needed.

## 2017-11-24 NOTE — HPI
"History obtained from medical record due to patient's dementia.  Mr. Michaels is an 89yo male with a PMHx of HTN, dementia, and h/o ischemic CVA without deficits in 2014, who originally presented to [facility] ED with c/o AMS.  Per family, patient is more confused than baseline with associated "inconprehenible speech, gait problem, and generalized weakness.  Patient was transferred to [facility] ED for higher level of care.  Initial work-up in ED resulted WBC 17.3, cr. 3.2, BUN 62, lactic 1, UA consistent with UTI.  Patient hemodynamically stable; Temp 100.7.  Hospital Medicine consulted for admission.  Patient has no documented h/o kidney disease; Last documented cr. of 1.1 in 2014.  Currently, patient is awake and alert, oriented to person.  Denies any complaints.  No slurred speech or weakness/hemiparesis appreciated.     "

## 2017-11-24 NOTE — ASSESSMENT & PLAN NOTE
- Hold home ACEi and HCTZ due to NICOLE.  - Will hold off on adding antihypertensives for now to prevent hypotension given sepsis.  Monitor BP trends, and add if needed.

## 2017-11-24 NOTE — SUBJECTIVE & OBJECTIVE
Past Medical History:   Diagnosis Date    Dementia     Diastolic dysfunction without heart failure 2014    History of ischemic stroke without residual deficits 2014    s/p tPA therapy    Hyperlipidemia     Hypertension        Past Surgical History:   Procedure Laterality Date    APPENDECTOMY      HERNIA REPAIR         Review of patient's allergies indicates:  No Known Allergies    No current facility-administered medications on file prior to encounter.      Current Outpatient Prescriptions on File Prior to Encounter   Medication Sig    aspirin (ECOTRIN) 81 MG EC tablet Take 1 tablet (81 mg total) by mouth once daily.    atorvastatin (LIPITOR) 40 MG tablet Take 1 tablet (40 mg total) by mouth once daily.    hydrochlorothiazide (HYDRODIURIL) 25 MG tablet Take 1 tablet (25 mg total) by mouth once daily.    lisinopril (PRINIVIL,ZESTRIL) 20 MG tablet Take 1 tablet (20 mg total) by mouth once daily.     Family History     Reviewed and not pertinent.         Social History Main Topics    Smoking status: Never Smoker    Smokeless tobacco: Never Used    Alcohol use No    Drug use: No    Sexual activity: Not on file     Review of Systems   Unable to perform ROS: Mental status change     Objective:     Vital Signs (Most Recent):  Temp: 99.1 °F (37.3 °C) (11/24/17 0454)  Pulse: 81 (11/24/17 0532)  Resp: 18 (11/24/17 0532)  BP: 137/79 (11/24/17 0532)  SpO2: 100 % (11/24/17 0532) Vital Signs (24h Range):  Temp:  [99.1 °F (37.3 °C)-100.7 °F (38.2 °C)] 99.1 °F (37.3 °C)  Pulse:  [80-87] 81  Resp:  [18-20] 18  SpO2:  [96 %-100 %] 100 %  BP: (132-157)/(73-89) 137/79     Weight: 71 kg (156 lb 9.6 oz)  Body mass index is 23.13 kg/m².    Physical Exam   Constitutional: Vital signs are normal. He appears well-developed. He is cooperative.   Elderly, frail.   HENT:   Head: Normocephalic and atraumatic.   Eyes: Conjunctivae and EOM are normal. Pupils are equal, round, and reactive to light.   Neck: Normal range of motion.  Neck supple. No JVD present.   Cardiovascular: Normal rate, regular rhythm, S1 normal, S2 normal, intact distal pulses and normal pulses.   No extrasystoles are present. Exam reveals no gallop.    No murmur heard.  Pulses:       Radial pulses are 2+ on the right side, and 2+ on the left side.        Dorsalis pedis pulses are 2+ on the right side, and 2+ on the left side.        Posterior tibial pulses are 2+ on the right side, and 2+ on the left side.   Pulmonary/Chest: Effort normal and breath sounds normal. No accessory muscle usage. No tachypnea. No respiratory distress. He has no wheezes. He has no rhonchi. He has no rales.   Abdominal: Soft. Normal appearance and bowel sounds are normal. He exhibits no distension. There is no tenderness. There is no rebound, no guarding and no CVA tenderness.   Musculoskeletal: Normal range of motion. He exhibits no edema, tenderness or deformity.   Neurological: He is alert. He is disoriented. GCS eye subscore is 4. GCS verbal subscore is 4. GCS motor subscore is 6.   No focal deficits.   Skin: Skin is warm and dry. Capillary refill takes 2 to 3 seconds. No rash noted. No erythema.   Poor skin turgor.    Psychiatric: He has a normal mood and affect. His speech is normal. Cognition and memory are impaired (dementia).   Nursing note and vitals reviewed.       Significant Labs:   Results for orders placed or performed during the hospital encounter of 11/24/17   CBC auto differential   Result Value Ref Range    WBC 17.31 (H) 3.90 - 12.70 K/uL    RBC 4.24 (L) 4.60 - 6.20 M/uL    Hemoglobin 13.4 (L) 14.0 - 18.0 g/dL    Hematocrit 39.8 (L) 40.0 - 54.0 %    MCV 94 82 - 98 fL    MCH 31.6 (H) 27.0 - 31.0 pg    MCHC 33.7 32.0 - 36.0 g/dL    RDW 13.9 11.5 - 14.5 %    Platelets 120 (L) 150 - 350 K/uL    MPV 9.8 9.2 - 12.9 fL    Gran # 14.8 (H) 1.8 - 7.7 K/uL    Lymph # 1.5 1.0 - 4.8 K/uL    Mono # 0.8 0.3 - 1.0 K/uL    Eos # 0.2 0.0 - 0.5 K/uL    Baso # 0.02 0.00 - 0.20 K/uL    Gran% 85.7  (H) 38.0 - 73.0 %    Lymph% 8.4 (L) 18.0 - 48.0 %    Mono% 4.5 4.0 - 15.0 %    Eosinophil% 1.3 0.0 - 8.0 %    Basophil% 0.1 0.0 - 1.9 %    Differential Method Automated    Comprehensive metabolic panel   Result Value Ref Range    Sodium 137 136 - 145 mmol/L    Potassium 3.8 3.5 - 5.1 mmol/L    Chloride 100 95 - 110 mmol/L    CO2 23 23 - 29 mmol/L    Glucose 89 70 - 110 mg/dL    BUN, Bld 62 (H) 8 - 23 mg/dL    Creatinine 3.2 (H) 0.5 - 1.4 mg/dL    Calcium 9.6 8.7 - 10.5 mg/dL    Total Protein 8.1 6.0 - 8.4 g/dL    Albumin 3.3 (L) 3.5 - 5.2 g/dL    Total Bilirubin 1.4 (H) 0.1 - 1.0 mg/dL    Alkaline Phosphatase 61 55 - 135 U/L    AST 22 10 - 40 U/L    ALT 23 10 - 44 U/L    Anion Gap 14 8 - 16 mmol/L    eGFR if African American 19 (A) >60 mL/min/1.73 m^2    eGFR if non African American 16 (A) >60 mL/min/1.73 m^2   Urinalysis   Result Value Ref Range    Specimen UA Urine, Catheterized     Color, UA Yellow Yellow, Straw, Carissa    Appearance, UA Cloudy (A) Clear    pH, UA 6.0 5.0 - 8.0    Specific Gravity, UA 1.020 1.005 - 1.030    Protein, UA Trace (A) Negative    Glucose, UA Negative Negative    Ketones, UA Negative Negative    Bilirubin (UA) Negative Negative    Occult Blood UA 3+ (A) Negative    Nitrite, UA Positive (A) Negative    Urobilinogen, UA Negative <2.0 EU/dL    Leukocytes, UA 1+ (A) Negative   TSH   Result Value Ref Range    TSH 3.054 0.400 - 4.000 uIU/mL   Magnesium   Result Value Ref Range    Magnesium 2.3 1.6 - 2.6 mg/dL   Phosphorus   Result Value Ref Range    Phosphorus 3.6 2.7 - 4.5 mg/dL   Influenza antigen Nasopharyngeal Swab   Result Value Ref Range    Influenza A Ag, EIA Negative Negative    Influenza B Ag, EIA Negative Negative    Flu A & B Source Nasopharyngeal Swab    Lactic acid, plasma   Result Value Ref Range    Lactate (Lactic Acid) 1.0 0.5 - 2.2 mmol/L   Urinalysis Microscopic   Result Value Ref Range    RBC, UA 80 (H) 0 - 4 /hpf    WBC, UA 40 (H) 0 - 5 /hpf    WBC Clumps, UA Occasional (A)  None-Rare    Bacteria, UA Many (A) None-Occ /hpf    Microscopic Comment SEE COMMENT       All pertinent labs within the past 24 hours have been reviewed.    Significant Imaging:   Imaging Results          X-Ray Chest 1 View (Preliminary result)  Result time 11/24/17 03:53:15    ED Interpretation by Brent Middleton MD (11/24/17 03:53:15, [facility], Emergency Medicine)    No acute finding                             I have reviewed all pertinent imaging results/findings within the past 24 hours.     EKG: (personally reviewed)  Sinus rhythm with 1st degree AV block, LVH.  No acute ischemic ST-T abnormalities.  No previous to compare.

## 2017-11-25 LAB
ALBUMIN SERPL BCP-MCNC: 2.7 G/DL
ALBUMIN SERPL BCP-MCNC: 2.7 G/DL
ALP SERPL-CCNC: 51 U/L
ALP SERPL-CCNC: 51 U/L
ALT SERPL W/O P-5'-P-CCNC: 14 U/L
ALT SERPL W/O P-5'-P-CCNC: 14 U/L
ANION GAP SERPL CALC-SCNC: 9 MMOL/L
ANION GAP SERPL CALC-SCNC: 9 MMOL/L
AST SERPL-CCNC: 16 U/L
AST SERPL-CCNC: 16 U/L
BACTERIA UR CULT: NO GROWTH
BASOPHILS # BLD AUTO: 0.02 K/UL
BASOPHILS # BLD AUTO: 0.02 K/UL
BASOPHILS NFR BLD: 0.2 %
BASOPHILS NFR BLD: 0.2 %
BILIRUB SERPL-MCNC: 1 MG/DL
BILIRUB SERPL-MCNC: 1 MG/DL
BUN SERPL-MCNC: 37 MG/DL
BUN SERPL-MCNC: 37 MG/DL
CALCIUM SERPL-MCNC: 8.6 MG/DL
CALCIUM SERPL-MCNC: 8.6 MG/DL
CHLORIDE SERPL-SCNC: 112 MMOL/L
CHLORIDE SERPL-SCNC: 112 MMOL/L
CO2 SERPL-SCNC: 19 MMOL/L
CO2 SERPL-SCNC: 19 MMOL/L
CREAT SERPL-MCNC: 1.8 MG/DL
CREAT SERPL-MCNC: 1.8 MG/DL
DIFFERENTIAL METHOD: ABNORMAL
DIFFERENTIAL METHOD: ABNORMAL
EOSINOPHIL # BLD AUTO: 0.3 K/UL
EOSINOPHIL # BLD AUTO: 0.3 K/UL
EOSINOPHIL NFR BLD: 3.6 %
EOSINOPHIL NFR BLD: 3.6 %
ERYTHROCYTE [DISTWIDTH] IN BLOOD BY AUTOMATED COUNT: 13.8 %
ERYTHROCYTE [DISTWIDTH] IN BLOOD BY AUTOMATED COUNT: 13.8 %
EST. GFR  (AFRICAN AMERICAN): 38 ML/MIN/1.73 M^2
EST. GFR  (AFRICAN AMERICAN): 38 ML/MIN/1.73 M^2
EST. GFR  (NON AFRICAN AMERICAN): 33 ML/MIN/1.73 M^2
EST. GFR  (NON AFRICAN AMERICAN): 33 ML/MIN/1.73 M^2
GLUCOSE SERPL-MCNC: 76 MG/DL
GLUCOSE SERPL-MCNC: 76 MG/DL
HCT VFR BLD AUTO: 34.8 %
HCT VFR BLD AUTO: 34.8 %
HGB BLD-MCNC: 11.6 G/DL
HGB BLD-MCNC: 11.6 G/DL
LYMPHOCYTES # BLD AUTO: 1.4 K/UL
LYMPHOCYTES # BLD AUTO: 1.4 K/UL
LYMPHOCYTES NFR BLD: 16.3 %
LYMPHOCYTES NFR BLD: 16.3 %
MAGNESIUM SERPL-MCNC: 1.9 MG/DL
MAGNESIUM SERPL-MCNC: 1.9 MG/DL
MCH RBC QN AUTO: 31.2 PG
MCH RBC QN AUTO: 31.2 PG
MCHC RBC AUTO-ENTMCNC: 33.3 G/DL
MCHC RBC AUTO-ENTMCNC: 33.3 G/DL
MCV RBC AUTO: 94 FL
MCV RBC AUTO: 94 FL
MONOCYTES # BLD AUTO: 0.6 K/UL
MONOCYTES # BLD AUTO: 0.6 K/UL
MONOCYTES NFR BLD: 7 %
MONOCYTES NFR BLD: 7 %
NEUTROPHILS # BLD AUTO: 6.3 K/UL
NEUTROPHILS # BLD AUTO: 6.3 K/UL
NEUTROPHILS NFR BLD: 72.9 %
NEUTROPHILS NFR BLD: 72.9 %
PHOSPHATE SERPL-MCNC: 2.6 MG/DL
PHOSPHATE SERPL-MCNC: 2.6 MG/DL
PLATELET # BLD AUTO: 114 K/UL
PLATELET # BLD AUTO: 114 K/UL
PMV BLD AUTO: 9.8 FL
PMV BLD AUTO: 9.8 FL
POTASSIUM SERPL-SCNC: 4 MMOL/L
POTASSIUM SERPL-SCNC: 4 MMOL/L
PROT SERPL-MCNC: 6.4 G/DL
PROT SERPL-MCNC: 6.4 G/DL
RBC # BLD AUTO: 3.72 M/UL
RBC # BLD AUTO: 3.72 M/UL
SODIUM SERPL-SCNC: 140 MMOL/L
SODIUM SERPL-SCNC: 140 MMOL/L
WBC # BLD AUTO: 8.69 K/UL
WBC # BLD AUTO: 8.69 K/UL

## 2017-11-25 PROCEDURE — 63600175 PHARM REV CODE 636 W HCPCS: Performed by: NURSE PRACTITIONER

## 2017-11-25 PROCEDURE — 25000003 PHARM REV CODE 250: Performed by: FAMILY MEDICINE

## 2017-11-25 PROCEDURE — 25000003 PHARM REV CODE 250: Performed by: NURSE PRACTITIONER

## 2017-11-25 PROCEDURE — 83735 ASSAY OF MAGNESIUM: CPT

## 2017-11-25 PROCEDURE — S0030 INJECTION, METRONIDAZOLE: HCPCS | Performed by: FAMILY MEDICINE

## 2017-11-25 PROCEDURE — G8997 SWALLOW GOAL STATUS: HCPCS | Mod: CI

## 2017-11-25 PROCEDURE — 63600175 PHARM REV CODE 636 W HCPCS: Performed by: FAMILY MEDICINE

## 2017-11-25 PROCEDURE — G8998 SWALLOW D/C STATUS: HCPCS | Mod: CI

## 2017-11-25 PROCEDURE — 85025 COMPLETE CBC W/AUTO DIFF WBC: CPT

## 2017-11-25 PROCEDURE — G8996 SWALLOW CURRENT STATUS: HCPCS | Mod: CI

## 2017-11-25 PROCEDURE — 80053 COMPREHEN METABOLIC PANEL: CPT

## 2017-11-25 PROCEDURE — 92610 EVALUATE SWALLOWING FUNCTION: CPT

## 2017-11-25 PROCEDURE — 11000001 HC ACUTE MED/SURG PRIVATE ROOM

## 2017-11-25 PROCEDURE — 84100 ASSAY OF PHOSPHORUS: CPT

## 2017-11-25 PROCEDURE — 25000003 PHARM REV CODE 250: Performed by: EMERGENCY MEDICINE

## 2017-11-25 RX ORDER — HYDRALAZINE HYDROCHLORIDE 20 MG/ML
10 INJECTION INTRAMUSCULAR; INTRAVENOUS EVERY 6 HOURS PRN
Status: DISCONTINUED | OUTPATIENT
Start: 2017-11-25 | End: 2017-11-26 | Stop reason: HOSPADM

## 2017-11-25 RX ADMIN — HYDRALAZINE HYDROCHLORIDE 10 MG: 20 INJECTION INTRAMUSCULAR; INTRAVENOUS at 09:11

## 2017-11-25 RX ADMIN — HEPARIN SODIUM 5000 UNITS: 5000 INJECTION, SOLUTION INTRAVENOUS; SUBCUTANEOUS at 05:11

## 2017-11-25 RX ADMIN — QUETIAPINE FUMARATE 25 MG: 25 TABLET, FILM COATED ORAL at 08:11

## 2017-11-25 RX ADMIN — HEPARIN SODIUM 5000 UNITS: 5000 INJECTION, SOLUTION INTRAVENOUS; SUBCUTANEOUS at 03:11

## 2017-11-25 RX ADMIN — METRONIDAZOLE 500 MG: 500 INJECTION, SOLUTION INTRAVENOUS at 03:11

## 2017-11-25 RX ADMIN — HEPARIN SODIUM 5000 UNITS: 5000 INJECTION, SOLUTION INTRAVENOUS; SUBCUTANEOUS at 09:11

## 2017-11-25 RX ADMIN — AMLODIPINE BESYLATE 5 MG: 5 TABLET ORAL at 09:11

## 2017-11-25 RX ADMIN — PANTOPRAZOLE SODIUM 40 MG: 40 TABLET, DELAYED RELEASE ORAL at 09:11

## 2017-11-25 RX ADMIN — ASPIRIN 81 MG: 81 TABLET, COATED ORAL at 09:11

## 2017-11-25 RX ADMIN — ATORVASTATIN CALCIUM 40 MG: 40 TABLET, FILM COATED ORAL at 09:11

## 2017-11-25 RX ADMIN — CEFTRIAXONE SODIUM 1 G: 1 INJECTION, POWDER, FOR SOLUTION INTRAMUSCULAR; INTRAVENOUS at 05:11

## 2017-11-25 RX ADMIN — METRONIDAZOLE 500 MG: 500 INJECTION, SOLUTION INTRAVENOUS at 09:11

## 2017-11-25 RX ADMIN — SODIUM CHLORIDE: 0.9 INJECTION, SOLUTION INTRAVENOUS at 08:11

## 2017-11-25 NOTE — PROGRESS NOTES
"[facility] Hospital Medicine  Progress Note    Patient Name: Guido Michaels  MRN: [id number]  Patient Class: IP- Inpatient   Admission Date: 11/24/2017  Length of Stay: 1 days  Attending Physician: Serge Sheth MD  Primary Care Provider: Vinicio Walter MD (Inactive)        Subjective:     Principal Problem:Pyelonephritis    HPI:  History obtained from medical record due to patient's dementia.  Mr. Michaels is an 89yo male  with a PMHx of HTN, dementia, and h/o ischemic CVA without deficits in 2014, who originally presented to [facility] ED with c/o AMS.  Per family, patient is more confused than baseline with associated "inconprehenible speech, gait problem, and generalized weakness.  Patient was transferred to [facility] ED for higher level of care.  Initial work-up in ED resulted WBC 17.3, cr. 3.2, BUN 62, lactic 1, UA consistent with UTI.  Patient hemodynamically stable; Temp 100.7.  Hospital Medicine consulted for admission.  Patient has no documented h/o kidney disease; Last documented cr. of 1.1 in 2014.  Currently, patient is awake and alert, oriented to person.  Denies any complaints.  No slurred speech or weakness/hemiparesis appreciated.       Hospital Course:  88 year old male admitted for pyelonephritis. ED evaluation revealed leukocytosis, Creatinine 3.2, BUN 62, lactic 1, UA consistent with UTI.  Patient hemodynamically stable; Temp 100.7. In the ED, sepsis protocol and IV hydration initiated. IV Rocephin started. Renal US indicated mild right sided hydronephrosis and bilateral cystic kidney disease. Lactic acid is normal. Blood and urine cultures show NGTD. WBC's have normalized, renal function improving.     Interval History: No acute events overnight. Sitter at bedside. Patient alert, denies any pain or discomfort. WBC's normalized.     Review of Systems   Constitutional: Negative.  Negative for activity change, appetite change, chills, fatigue and fever.   HENT: Negative.    Eyes: " Negative.    Respiratory: Negative.  Negative for cough and shortness of breath.    Cardiovascular: Negative.  Negative for chest pain and leg swelling.   Gastrointestinal: Positive for abdominal pain. Negative for constipation, diarrhea, nausea and vomiting.   Endocrine: Negative.    Genitourinary: Negative.  Negative for dysuria, flank pain, frequency and urgency.   Musculoskeletal: Negative.  Negative for back pain.   Skin: Negative.    Allergic/Immunologic: Negative.    Neurological: Negative.    Hematological: Negative.    Psychiatric/Behavioral: Positive for confusion.     Objective:     Vital Signs (Most Recent):  Temp: 99.3 °F (37.4 °C) (11/25/17 1122)  Pulse: 65 (11/25/17 1122)  Resp: 19 (11/25/17 1122)  BP: (!) 185/97 (11/25/17 1122)  SpO2: 95 % (11/25/17 1122) Vital Signs (24h Range):  Temp:  [97.8 °F (36.6 °C)-99.3 °F (37.4 °C)] 99.3 °F (37.4 °C)  Pulse:  [55-80] 65  Resp:  [15-19] 19  SpO2:  [95 %-99 %] 95 %  BP: (160-186)/(92-99) 185/97     Weight: 71 kg (156 lb 9.6 oz)  Body mass index is 23.13 kg/m².    Intake/Output Summary (Last 24 hours) at 11/25/17 1355  Last data filed at 11/25/17 0600   Gross per 24 hour   Intake          3240.42 ml   Output              725 ml   Net          2515.42 ml      Physical Exam   Constitutional: Vital signs are normal. He appears well-developed. He is cooperative.   Elderly, frail.   HENT:   Head: Normocephalic and atraumatic.   Eyes: Conjunctivae and EOM are normal. Pupils are equal, round, and reactive to light.   Neck: Normal range of motion. Neck supple. No JVD present.   Cardiovascular: Normal rate, regular rhythm, S1 normal, S2 normal, intact distal pulses and normal pulses.   No extrasystoles are present. Exam reveals no gallop.    No murmur heard.  Pulses:       Radial pulses are 2+ on the right side, and 2+ on the left side.        Dorsalis pedis pulses are 2+ on the right side, and 2+ on the left side.        Posterior tibial pulses are 2+ on the right  side, and 2+ on the left side.   Pulmonary/Chest: Effort normal and breath sounds normal. No accessory muscle usage. No tachypnea. No respiratory distress. He has no wheezes. He has no rhonchi. He has no rales.   Abdominal: Soft. Normal appearance and bowel sounds are normal. He exhibits no distension. There is no tenderness. There is no rebound, no guarding and no CVA tenderness.   Musculoskeletal: Normal range of motion. He exhibits no edema, tenderness or deformity.   Neurological: He is alert. He is disoriented. GCS eye subscore is 4. GCS verbal subscore is 4. GCS motor subscore is 6.   No focal deficits.   Skin: Skin is warm and dry. Capillary refill takes 2 to 3 seconds. No rash noted. No erythema.   Bilateral LE's with discoloration    Psychiatric: He has a normal mood and affect. His speech is normal. Cognition and memory are impaired (dementia).   Nursing note and vitals reviewed.      Significant Labs:   Blood Culture:   Recent Labs  Lab 11/24/17 0315 11/24/17  0329   LABBLOO No Growth to date  No Growth to date No Growth to date  No Growth to date     BMP:   Recent Labs  Lab 11/25/17  0553   GLU 76  76     140   K 4.0  4.0   *  112*   CO2 19*  19*   BUN 37*  37*   CREATININE 1.8*  1.8*   CALCIUM 8.6*  8.6*   MG 1.9  1.9     CBC:   Recent Labs  Lab 11/24/17  0315 11/25/17  0553   WBC 17.31* 8.69  8.69   HGB 13.4* 11.6*  11.6*   HCT 39.8* 34.8*  34.8*   * 114*  114*     CMP:   Recent Labs  Lab 11/24/17  0315 11/24/17  1111 11/25/17  0553    136 140  140   K 3.8 3.9 4.0  4.0    105 112*  112*   CO2 23 21* 19*  19*   GLU 89 83 76  76   BUN 62* 56* 37*  37*   CREATININE 3.2* 2.6* 1.8*  1.8*   CALCIUM 9.6 8.8 8.6*  8.6*   PROT 8.1  --  6.4  6.4   ALBUMIN 3.3*  --  2.7*  2.7*   BILITOT 1.4*  --  1.0  1.0   ALKPHOS 61  --  51*  51*   AST 22  --  16  16   ALT 23  --  14  14   ANIONGAP 14 10 9  9   EGFRNONAA 16* 21* 33*  33*     Urine Studies:   Recent  Labs  Lab 11/24/17  0340   COLORU Yellow   APPEARANCEUA Cloudy*   PHUR 6.0   SPECGRAV 1.020   PROTEINUA Trace*   GLUCUA Negative   KETONESU Negative   BILIRUBINUA Negative   OCCULTUA 3+*   NITRITE Positive*   UROBILINOGEN Negative   LEUKOCYTESUR 1+*   RBCUA 80*   WBCUA 40*   BACTERIA Many*     All pertinent labs within the past 24 hours have been reviewed.    Significant Imaging:   Imaging Results          US Retroperitoneal Complete (Kidney and (Final result)  Result time 11/24/17 09:37:47    Final result by Samuel Grant MD (11/24/17 09:37:47)                 Impression:     Mild right-sided hydronephrosis. Bilateral cystic kidney disease.          Electronically signed by: SAMUEL GRANT MD  Date:     11/24/17  Time:    09:37              Narrative:    Comparison:  6/20/14    History:  Acute kidney injury    Findings:    The kidneys are normal in size bilaterally measuring 9.3 cm on the right and 11.2 cm on the left. Mild right-sided hydronephrosis. Bilateral renal cysts are noted in the largest within the lower pole the right kidney measuring up to 6.7 cm. Largest cyst within the left kidney measures 1.5 cm. There is no evidence of nephrolithiasis. Adequate profusion is noted. Limited images of the urinary bladder are unremarkable.                             CT Head Without Contrast (Final result)  Result time 11/24/17 07:38:32    Final result by Edward Garzon III, MD (11/24/17 07:38:32)                 Impression:       1.  No acute intracranial disease identified.    2.  Age related atrophy.  Extensive chronic microvascular ischemic change.  Stable small chronic lacunar infarct in the left cerebellum.        Electronically signed by: EDWARD GARZON MD  Date:     11/24/17  Time:    07:38              Narrative:    Head CT without contrast    Clinical history: Altered mental status.  History of dementia.  r/o CVA    TECHNIQUE: Routine noncontrast axial head CT. All CT scans at this facility use dose  modulation, iterative reconstruction, and/or weight based dosing when appropriate to reduce radiation dose to as low as reasonably achievable.    Comparison: MRI 06/19/2014    FINDINGS: There is persistent diffuse age-related atrophy with commensurate expansion of the ventricles.  Atherosclerotic calcifications of the intracranial arteries are noted. There is persistent moderate to severe bihemispheric white matter hypodensity, most consistent with chronic microvascular ischemic change.  Small chronic lacunar infarct of the left cerebellum is again noted.  There is no CT evidence of acute cortical ischemia, intracranial hemorrhage, mass-effect,  obstructive hydrocephalus or other acute disease. The visualized paranasal sinuses and mastoid air cells are clear. No calvarial fracture is identified.                             X-Ray Chest 1 View (Final result)  Result time 11/24/17 07:35:03    Final result by Samuel Grant MD (11/24/17 07:35:03)                 Impression:     Atelectasis or airspace disease versus aspiration left lower lobe.       Electronically signed by: SAMUEL GRANT MD  Date:     11/24/17  Time:    07:35              Narrative:    Clinical Data:50    Comparison:  none    Findings:  Single view of the chest.      Cardiac silhouette is normal.  Atelectasis or airspace disease versus aspiration left lower lobe. Trace bilateral pleural effusions are not excluded..  No evidence of pneumothorax.  Bones demonstrate degenerative changes.                            Assessment/Plan:      * Acute Cystitis     -Sepsis protocol and IV hydration initiated in the ED  -UA consistent with UTI   -Blood culture shows NGTD   - Urine culture shows NGTD   - Empiric abx with IV Rocephin.  - Aggressive IVF's.  - Strict I&O's.  -WBC's normalized         History of ischemic stroke without residual deficits    - Resume home ASA and statin.        Essential hypertension    - Hold home ACEi and HCTZ due to NICOLE.  -Blood  pressure elevated   -Amlodipine started   -Hydralazine IV PRN   -Monitor         Sepsis    - Likely due to above.  - Patient remains hemodynamically stable; Lactic 1.  - IV fluid resuscitation initiated in ED.  - Continuous IVF's with 0.9% NS @ 125 mL/hr.  - Blood and urine cultures show NGTD .  - IV Rocephin  - Follow serial lactic; Daily labs.  - Antipyretics and antiemetics PRN.  -WBC's normalized   -Afebrile         Acute renal failure    - Likely due to above + IVVD + ACEi/HCTZ.  - Renal U/S showed Mild right-sided hydronephrosis. Bilateral cystic kidney disease   - Aggressive IV hydration with 0.9% NS @ 125 mL/hr.  - Hold home ACEi and HCTZ.  - Avoid nephrotoxic agents.  - Strict I&O's.  - Follow kidney function closely.  Will consult Renal if needed.  -Renal function improving           VTE Risk Mitigation         Ordered     heparin (porcine) injection 5,000 Units  Every 8 hours     Route:  Subcutaneous        11/24/17 1141     Medium Risk of VTE  Once      11/24/17 0654     Place sequential compression device  Until discontinued      11/24/17 0654              Rocio Michaels NP  Department of Hospital Medicine   [facility]

## 2017-11-25 NOTE — HOSPITAL COURSE
88 year old male admitted for UTI. ED evaluation revealed leukocytosis, Creatinine 3.2, BUN 62, lactic 1, UA consistent with UTI.  Patient hemodynamically stable; Temp 100.7. In the ED, sepsis protocol and IV hydration initiated. IV Rocephin started. Renal US indicated mild right sided hydronephrosis and bilateral cystic kidney disease. Lactic acid is normal. ACEI/HCTZ held. Blood and urine cultures show NGTD. Blood pressure elevated, Amlodipine 5mg po started. WBC's have normalized, renal function improving. Potassium 3.4 will replete. Amlodipine increased to 10mg for better blood pressure control. Symptoms improved. Case discussed with Dr. Sheth. New prescription given. Home meds reconciled. Patient's wife instructed to check and keep a blood pressure log for PCP to see. Patient to follow-up with PCP in 3-5 days for hospital follow-up and re-evaluate bp meds. Patient also to follow-up with Cardiology in 1-2 weeks. Patient seen and examined on the date of discharge and found to be suitable for discharge.

## 2017-11-25 NOTE — PLAN OF CARE
Problem: Patient Care Overview  Goal: Plan of Care Review  Outcome: Ongoing (interventions implemented as appropriate)  Patient remains free from injury. Sitter at the bedside throughout the day. Patient turning and repositioning himself in bed. Denies pain. Family is at the bedside. 12 hour chart check complete. Will continue to monitor.

## 2017-11-25 NOTE — ASSESSMENT & PLAN NOTE
- Likely due to above + IVVD + ACEi/HCTZ.  - Renal U/S showed Mild right-sided hydronephrosis. Bilateral cystic kidney disease   - Aggressive IV hydration with 0.9% NS @ 125 mL/hr.  - Hold home ACEi and HCTZ.  - Avoid nephrotoxic agents.  - Strict I&O's.  - Follow kidney function closely.  Will consult Renal if needed.  -Renal function improving

## 2017-11-25 NOTE — PLAN OF CARE
Problem: Patient Care Overview  Goal: Plan of Care Review  Outcome: Ongoing (interventions implemented as appropriate)  Remains free from harm, remains confused, oriented to self only, sitter @ BS, no acute distress noted. 24 hour chart check complete.

## 2017-11-25 NOTE — PT/OT/SLP EVAL
Speech Language Pathology  Evaluation    Guido Michaels   MRN: [id number]   Admitting Diagnosis: Pyelonephritis    Diet recommendations: Solid Diet Level: Dental Soft  Liquid Diet Level: Thin HOB to 90 degrees, Remain upright 30 minutes post meal, Eliminate distractions and Avoid talking while eating    SLP Treatment Date: 11/25/17  Speech Start Time: 0816     Speech Stop Time: 0833     Speech Total (min): 17 min       TREATMENT BILLABLE MINUTES:  Eval Swallow and Oral Function 17    Diagnosis: Pyelonephritis      Past Medical History:   Diagnosis Date    Dementia     Diastolic dysfunction without heart failure 2014    History of ischemic stroke without residual deficits 2014    s/p tPA therapy    Hyperlipidemia     Hypertension      Past Surgical History:   Procedure Laterality Date    APPENDECTOMY      HERNIA REPAIR         Has the patient been evaluated by SLP for swallowing? : No  Keep patient NPO?: No   General Precautions: Standard,            Social Hx: Patient lives with spouse at home.    Prior diet: regular.    Subjective:  Pt alert and seen at bedside with family present.  Patient goals: n/a.    Pain/Comfort  Pain Rating 1: 0/10  Pain Rating Post-Intervention 1: 0/10    Objective:        Oral Musculature Evaluation  Dentition: scattered dentition  Mucosal Quality: good  Oral Labial Strength and Mobility: WFL  Lingual Strength and Mobility: WFL  Velar Elevation: WFL  Buccal Strength and Mobility: WFL  Voice Prior to PO Intake: clear     Bedside Swallow Eval:  Consistencies Assessed: Thin liquids via straw  Oral Phase: WFL  Pharyngeal Phase: no overt clinical  signs/symptoms of aspiration and no overt clinical signs/symptoms of pharyngeal dysphagia    Oral mech unremarkable. Observed po intake of breakfast consisting of thin liquids via straw, scrambled eggs, grits, and muffin. Pt tolerated without any overt s/s of aspiration.     Assessment:  Guido Michaels is a 88 y.o. male with a medical diagnosis of  Pyelonephritis and presents with no overt s/s of aspiration.          Discharge recommendations: Discharge Facility/Level Of Care Needs: home with home health     Goals:    SLP Goals     Not on file                 Plan:   Patient to be seen     Plan of Care expires:    Plan of Care reviewed with: patient, family  SLP Follow-up?: No              Merissa Subramanian CCC-SLP  11/25/2017

## 2017-11-25 NOTE — ASSESSMENT & PLAN NOTE
- Hold home ACEi and HCTZ due to NICOLE.  -Blood pressure elevated   -Amlodipine started   -Hydralazine IV PRN   -Monitor

## 2017-11-25 NOTE — ASSESSMENT & PLAN NOTE
- Likely due to above.  - Patient remains hemodynamically stable; Lactic 1.  - IV fluid resuscitation initiated in ED.  - Continuous IVF's with 0.9% NS @ 125 mL/hr.  - Blood and urine cultures show NGTD .  - IV Rocephin  - Follow serial lactic; Daily labs.  - Antipyretics and antiemetics PRN.  -WBC's normalized   -Afebrile

## 2017-11-25 NOTE — ASSESSMENT & PLAN NOTE
-Sepsis protocol and IV hydration initiated in the ED  -UA consistent with UTI   -Blood culture shows NGTD   - Urine culture shows NGTD   - Empiric abx with IV Rocephin.  - Aggressive IVF's.  - Strict I&O's.  -WBC's normalized

## 2017-11-25 NOTE — SUBJECTIVE & OBJECTIVE
Interval History: No acute events overnight. Sitter at bedside. Patient alert, denies any pain or discomfort. WBC's normalized.     Review of Systems   Constitutional: Negative.  Negative for activity change, appetite change, chills, fatigue and fever.   HENT: Negative.    Eyes: Negative.    Respiratory: Negative.  Negative for cough and shortness of breath.    Cardiovascular: Negative.  Negative for chest pain and leg swelling.   Gastrointestinal: Positive for abdominal pain. Negative for constipation, diarrhea, nausea and vomiting.   Endocrine: Negative.    Genitourinary: Negative.  Negative for dysuria, flank pain, frequency and urgency.   Musculoskeletal: Negative.  Negative for back pain.   Skin: Negative.    Allergic/Immunologic: Negative.    Neurological: Negative.    Hematological: Negative.    Psychiatric/Behavioral: Positive for confusion.     Objective:     Vital Signs (Most Recent):  Temp: 99.3 °F (37.4 °C) (11/25/17 1122)  Pulse: 65 (11/25/17 1122)  Resp: 19 (11/25/17 1122)  BP: (!) 185/97 (11/25/17 1122)  SpO2: 95 % (11/25/17 1122) Vital Signs (24h Range):  Temp:  [97.8 °F (36.6 °C)-99.3 °F (37.4 °C)] 99.3 °F (37.4 °C)  Pulse:  [55-80] 65  Resp:  [15-19] 19  SpO2:  [95 %-99 %] 95 %  BP: (160-186)/(92-99) 185/97     Weight: 71 kg (156 lb 9.6 oz)  Body mass index is 23.13 kg/m².    Intake/Output Summary (Last 24 hours) at 11/25/17 1355  Last data filed at 11/25/17 0600   Gross per 24 hour   Intake          3240.42 ml   Output              725 ml   Net          2515.42 ml      Physical Exam   Constitutional: Vital signs are normal. He appears well-developed. He is cooperative.   Elderly, frail.   HENT:   Head: Normocephalic and atraumatic.   Eyes: Conjunctivae and EOM are normal. Pupils are equal, round, and reactive to light.   Neck: Normal range of motion. Neck supple. No JVD present.   Cardiovascular: Normal rate, regular rhythm, S1 normal, S2 normal, intact distal pulses and normal pulses.   No  extrasystoles are present. Exam reveals no gallop.    No murmur heard.  Pulses:       Radial pulses are 2+ on the right side, and 2+ on the left side.        Dorsalis pedis pulses are 2+ on the right side, and 2+ on the left side.        Posterior tibial pulses are 2+ on the right side, and 2+ on the left side.   Pulmonary/Chest: Effort normal and breath sounds normal. No accessory muscle usage. No tachypnea. No respiratory distress. He has no wheezes. He has no rhonchi. He has no rales.   Abdominal: Soft. Normal appearance and bowel sounds are normal. He exhibits no distension. There is no tenderness. There is no rebound, no guarding and no CVA tenderness.   Musculoskeletal: Normal range of motion. He exhibits no edema, tenderness or deformity.   Neurological: He is alert. He is disoriented. GCS eye subscore is 4. GCS verbal subscore is 4. GCS motor subscore is 6.   No focal deficits.   Skin: Skin is warm and dry. Capillary refill takes 2 to 3 seconds. No rash noted. No erythema.   Bilateral LE's with discoloration    Psychiatric: He has a normal mood and affect. His speech is normal. Cognition and memory are impaired (dementia).   Nursing note and vitals reviewed.      Significant Labs:   Blood Culture:   Recent Labs  Lab 11/24/17 0315 11/24/17  0329   LABBLOO No Growth to date  No Growth to date No Growth to date  No Growth to date     BMP:   Recent Labs  Lab 11/25/17  0553   GLU 76  76     140   K 4.0  4.0   *  112*   CO2 19*  19*   BUN 37*  37*   CREATININE 1.8*  1.8*   CALCIUM 8.6*  8.6*   MG 1.9  1.9     CBC:   Recent Labs  Lab 11/24/17 0315 11/25/17  0553   WBC 17.31* 8.69  8.69   HGB 13.4* 11.6*  11.6*   HCT 39.8* 34.8*  34.8*   * 114*  114*     CMP:   Recent Labs  Lab 11/24/17 0315 11/24/17  1111 11/25/17  0553    136 140  140   K 3.8 3.9 4.0  4.0    105 112*  112*   CO2 23 21* 19*  19*   GLU 89 83 76  76   BUN 62* 56* 37*  37*   CREATININE 3.2* 2.6*  1.8*  1.8*   CALCIUM 9.6 8.8 8.6*  8.6*   PROT 8.1  --  6.4  6.4   ALBUMIN 3.3*  --  2.7*  2.7*   BILITOT 1.4*  --  1.0  1.0   ALKPHOS 61  --  51*  51*   AST 22  --  16  16   ALT 23  --  14  14   ANIONGAP 14 10 9  9   EGFRNONAA 16* 21* 33*  33*     Urine Studies:   Recent Labs  Lab 11/24/17  0340   COLORU Yellow   APPEARANCEUA Cloudy*   PHUR 6.0   SPECGRAV 1.020   PROTEINUA Trace*   GLUCUA Negative   KETONESU Negative   BILIRUBINUA Negative   OCCULTUA 3+*   NITRITE Positive*   UROBILINOGEN Negative   LEUKOCYTESUR 1+*   RBCUA 80*   WBCUA 40*   BACTERIA Many*     All pertinent labs within the past 24 hours have been reviewed.    Significant Imaging:   Imaging Results          US Retroperitoneal Complete (Kidney and (Final result)  Result time 11/24/17 09:37:47    Final result by Samuel Grant MD (11/24/17 09:37:47)                 Impression:     Mild right-sided hydronephrosis. Bilateral cystic kidney disease.          Electronically signed by: SAMUEL GRANT MD  Date:     11/24/17  Time:    09:37              Narrative:    Comparison:  6/20/14    History:  Acute kidney injury    Findings:    The kidneys are normal in size bilaterally measuring 9.3 cm on the right and 11.2 cm on the left. Mild right-sided hydronephrosis. Bilateral renal cysts are noted in the largest within the lower pole the right kidney measuring up to 6.7 cm. Largest cyst within the left kidney measures 1.5 cm. There is no evidence of nephrolithiasis. Adequate profusion is noted. Limited images of the urinary bladder are unremarkable.                             CT Head Without Contrast (Final result)  Result time 11/24/17 07:38:32    Final result by Guido Pacheco III, MD (11/24/17 07:38:32)                 Impression:       1.  No acute intracranial disease identified.    2.  Age related atrophy.  Extensive chronic microvascular ischemic change.  Stable small chronic lacunar infarct in the left cerebellum.        Electronically  signed by: EDWARD GARZON MD  Date:     11/24/17  Time:    07:38              Narrative:    Head CT without contrast    Clinical history: Altered mental status.  History of dementia.  r/o CVA    TECHNIQUE: Routine noncontrast axial head CT. All CT scans at this facility use dose modulation, iterative reconstruction, and/or weight based dosing when appropriate to reduce radiation dose to as low as reasonably achievable.    Comparison: MRI 06/19/2014    FINDINGS: There is persistent diffuse age-related atrophy with commensurate expansion of the ventricles.  Atherosclerotic calcifications of the intracranial arteries are noted. There is persistent moderate to severe bihemispheric white matter hypodensity, most consistent with chronic microvascular ischemic change.  Small chronic lacunar infarct of the left cerebellum is again noted.  There is no CT evidence of acute cortical ischemia, intracranial hemorrhage, mass-effect,  obstructive hydrocephalus or other acute disease. The visualized paranasal sinuses and mastoid air cells are clear. No calvarial fracture is identified.                             X-Ray Chest 1 View (Final result)  Result time 11/24/17 07:35:03    Final result by Samuel Grant MD (11/24/17 07:35:03)                 Impression:     Atelectasis or airspace disease versus aspiration left lower lobe.       Electronically signed by: SAMUEL GRANT MD  Date:     11/24/17  Time:    07:35              Narrative:    Clinical Data:50    Comparison:  none    Findings:  Single view of the chest.      Cardiac silhouette is normal.  Atelectasis or airspace disease versus aspiration left lower lobe. Trace bilateral pleural effusions are not excluded..  No evidence of pneumothorax.  Bones demonstrate degenerative changes.

## 2017-11-26 VITALS
HEIGHT: 69 IN | DIASTOLIC BLOOD PRESSURE: 84 MMHG | TEMPERATURE: 99 F | WEIGHT: 156.63 LBS | BODY MASS INDEX: 23.2 KG/M2 | RESPIRATION RATE: 18 BRPM | HEART RATE: 85 BPM | SYSTOLIC BLOOD PRESSURE: 160 MMHG | OXYGEN SATURATION: 96 %

## 2017-11-26 LAB
ALBUMIN SERPL BCP-MCNC: 3 G/DL
ALP SERPL-CCNC: 59 U/L
ALT SERPL W/O P-5'-P-CCNC: 16 U/L
ANION GAP SERPL CALC-SCNC: 11 MMOL/L
AST SERPL-CCNC: 17 U/L
BASOPHILS # BLD AUTO: 0.02 K/UL
BASOPHILS NFR BLD: 0.2 %
BILIRUB SERPL-MCNC: 0.8 MG/DL
BUN SERPL-MCNC: 21 MG/DL
CALCIUM SERPL-MCNC: 9 MG/DL
CHLORIDE SERPL-SCNC: 107 MMOL/L
CO2 SERPL-SCNC: 21 MMOL/L
CREAT SERPL-MCNC: 1.4 MG/DL
DIFFERENTIAL METHOD: ABNORMAL
EOSINOPHIL # BLD AUTO: 0.3 K/UL
EOSINOPHIL NFR BLD: 3.8 %
ERYTHROCYTE [DISTWIDTH] IN BLOOD BY AUTOMATED COUNT: 13.5 %
EST. GFR  (AFRICAN AMERICAN): 51 ML/MIN/1.73 M^2
EST. GFR  (NON AFRICAN AMERICAN): 45 ML/MIN/1.73 M^2
GLUCOSE SERPL-MCNC: 85 MG/DL
HCT VFR BLD AUTO: 40.4 %
HGB BLD-MCNC: 13.7 G/DL
LYMPHOCYTES # BLD AUTO: 1.5 K/UL
LYMPHOCYTES NFR BLD: 18.3 %
MAGNESIUM SERPL-MCNC: 1.6 MG/DL
MCH RBC QN AUTO: 31.4 PG
MCHC RBC AUTO-ENTMCNC: 33.9 G/DL
MCV RBC AUTO: 93 FL
MONOCYTES # BLD AUTO: 0.7 K/UL
MONOCYTES NFR BLD: 8.5 %
NEUTROPHILS # BLD AUTO: 5.7 K/UL
NEUTROPHILS NFR BLD: 69.2 %
PHOSPHATE SERPL-MCNC: 2.1 MG/DL
PLATELET # BLD AUTO: 140 K/UL
PMV BLD AUTO: 9.6 FL
POTASSIUM SERPL-SCNC: 3.4 MMOL/L
PROT SERPL-MCNC: 7.2 G/DL
RBC # BLD AUTO: 4.36 M/UL
SODIUM SERPL-SCNC: 139 MMOL/L
WBC # BLD AUTO: 8.21 K/UL

## 2017-11-26 PROCEDURE — 85025 COMPLETE CBC W/AUTO DIFF WBC: CPT

## 2017-11-26 PROCEDURE — 63600175 PHARM REV CODE 636 W HCPCS: Performed by: NURSE PRACTITIONER

## 2017-11-26 PROCEDURE — 25000003 PHARM REV CODE 250: Performed by: EMERGENCY MEDICINE

## 2017-11-26 PROCEDURE — 80053 COMPREHEN METABOLIC PANEL: CPT

## 2017-11-26 PROCEDURE — 96372 THER/PROPH/DIAG INJ SC/IM: CPT

## 2017-11-26 PROCEDURE — 36415 COLL VENOUS BLD VENIPUNCTURE: CPT

## 2017-11-26 PROCEDURE — 63600175 PHARM REV CODE 636 W HCPCS: Performed by: FAMILY MEDICINE

## 2017-11-26 PROCEDURE — 84100 ASSAY OF PHOSPHORUS: CPT

## 2017-11-26 PROCEDURE — 25000003 PHARM REV CODE 250: Performed by: NURSE PRACTITIONER

## 2017-11-26 PROCEDURE — 83735 ASSAY OF MAGNESIUM: CPT

## 2017-11-26 RX ORDER — ASPIRIN 81 MG/1
81 TABLET ORAL DAILY
Refills: 0 | COMMUNITY
Start: 2017-11-27 | End: 2020-08-20

## 2017-11-26 RX ORDER — AMLODIPINE BESYLATE 5 MG/1
10 TABLET ORAL DAILY
Qty: 60 TABLET | Refills: 0 | Status: SHIPPED | OUTPATIENT
Start: 2017-11-27 | End: 2017-12-27

## 2017-11-26 RX ORDER — POTASSIUM CHLORIDE 20 MEQ/1
40 TABLET, EXTENDED RELEASE ORAL ONCE
Status: DISCONTINUED | OUTPATIENT
Start: 2017-11-26 | End: 2017-11-26 | Stop reason: HOSPADM

## 2017-11-26 RX ADMIN — HEPARIN SODIUM 5000 UNITS: 5000 INJECTION, SOLUTION INTRAVENOUS; SUBCUTANEOUS at 05:11

## 2017-11-26 RX ADMIN — HYDRALAZINE HYDROCHLORIDE 10 MG: 20 INJECTION INTRAMUSCULAR; INTRAVENOUS at 05:11

## 2017-11-26 RX ADMIN — ATORVASTATIN CALCIUM 40 MG: 40 TABLET, FILM COATED ORAL at 09:11

## 2017-11-26 RX ADMIN — PANTOPRAZOLE SODIUM 40 MG: 40 TABLET, DELAYED RELEASE ORAL at 09:11

## 2017-11-26 RX ADMIN — AMLODIPINE BESYLATE 5 MG: 5 TABLET ORAL at 09:11

## 2017-11-26 RX ADMIN — ASPIRIN 81 MG: 81 TABLET, COATED ORAL at 09:11

## 2017-11-26 RX ADMIN — CEFTRIAXONE SODIUM 1 G: 1 INJECTION, POWDER, FOR SOLUTION INTRAMUSCULAR; INTRAVENOUS at 05:11

## 2017-11-26 NOTE — PLAN OF CARE
Problem: Patient Care Overview  Goal: Plan of Care Review  Outcome: Ongoing (interventions implemented as appropriate)  Discharged. Patient remained free from injury. No c/o pain at this time. Family at bedside. Discharge instructions given to family due to patient's confusion status. Family verbalized understanding. IV removed. Cath tip intact. Off floor via wheel chair accompanied by staff.

## 2017-11-26 NOTE — PLAN OF CARE
Problem: Patient Care Overview  Goal: Plan of Care Review  Outcome: Ongoing (interventions implemented as appropriate)  Injury free this shift. Sitter at bedside for safety. Pt oriented to self only. IVF infusing as ordered. Hydralazine administered as ordered PRN for BP control. Pt able to turn in bed with encouragement. VSS. Chart reviewed. Will monitor.

## 2017-11-26 NOTE — PROGRESS NOTES
CAROLINA advised by nursing that patient is ready for discharge and has no one to transport him home.  He lives in [place] and his family does not have money for gas to return to  to transport patient home.  CAROLINA f/u with patient to discuss transportation issues.  Patient stated that his wife, Kay Michaels is trying to find someone to help them.  Patient stated that he has a car but his wife's son who is in [place] does not have money for gas.  Patient stated that he has some relatives in [place] that his wife is trying to contact.  SW will f/u with patient's wife when she returns to the room.    Paula Altman LMSW, JALEN-CAROLINA, [facility]  11/26/2017

## 2017-11-26 NOTE — DISCHARGE SUMMARY
"[facility] Hospital Medicine  Discharge Summary      Patient Name: Guido Michaels  MRN: [id number]  Admission Date: 11/24/2017  Hospital Length of Stay: 2 days  Discharge Date and Time:  11/26/2017 12:47 PM  Attending Physician: Serge Sheth MD   Discharging Provider: Rocio Michaels NP  Primary Care Provider: Vinicio Walter MD (Inactive)      HPI:   History obtained from medical record due to patient's dementia.  Mr. Michaels is an 87yo male  with a PMHx of HTN, dementia, and h/o ischemic CVA without deficits in 2014, who originally presented to [facility] ED with c/o AMS.  Per family, patient is more confused than baseline with associated "inconprehenible speech, gait problem, and generalized weakness.  Patient was transferred to [facility] ED for higher level of care.  Initial work-up in ED resulted WBC 17.3, cr. 3.2, BUN 62, lactic 1, UA consistent with UTI.  Patient hemodynamically stable; Temp 100.7.  Hospital Medicine consulted for admission.  Patient has no documented h/o kidney disease; Last documented cr. of 1.1 in 2014.  Currently, patient is awake and alert, oriented to person.  Denies any complaints.  No slurred speech or weakness/hemiparesis appreciated.       * No surgery found *      Hospital Course:   88 year old male admitted for UTI. ED evaluation revealed leukocytosis, Creatinine 3.2, BUN 62, lactic 1, UA consistent with UTI.  Patient hemodynamically stable; Temp 100.7. In the ED, sepsis protocol and IV hydration initiated. IV Rocephin started. Renal US indicated mild right sided hydronephrosis and bilateral cystic kidney disease. Lactic acid is normal. ACEI/HCTZ held. Blood and urine cultures show NGTD. Blood pressure elevated, Amlodipine 5mg po started. WBC's have normalized, renal function improving. Potassium 3.4 will replete. Amlodipine increased to 10mg for better blood pressure control. Symptoms improved. Case discussed with Dr. Sheth. New prescription given. Home meds " reconciled. Patient's wife instructed to check and keep a blood pressure log for PCP to see. Patient to follow-up with PCP in 3-5 days for hospital follow-up and re-evaluate bp meds. Patient also to follow-up with Cardiology in 1-2 weeks. Patient seen and examined on the date of discharge and found to be suitable for discharge.      Consults:   Consults         Status Ordering Provider     Inpatient consult to Internal Medicine  Once     Provider:  Raad Santos MD    Acknowledged CHERRY FROST          No new Assessment & Plan notes have been filed under this hospital service since the last note was generated.  Service: Hospital Medicine    Final Active Diagnoses:    Diagnosis Date Noted POA    PRINCIPAL PROBLEM:  Pyelonephritis [N12] 11/24/2017 Yes    Acute renal failure [N17.9] 11/24/2017 Yes    Sepsis [A41.9] 11/24/2017 Yes    Essential hypertension [I10] 11/24/2017 Yes     Chronic    History of ischemic stroke without residual deficits [Z86.73] 11/24/2017 Not Applicable     Chronic      Problems Resolved During this Admission:    Diagnosis Date Noted Date Resolved POA    Encephalopathy, metabolic [G93.41] 06/19/2014 11/25/2017 Yes       Discharged Condition: stable    Disposition: Home or Self Care    Follow Up:  Follow-up Information     FLORA Coates In 3 days.    Specialty:  Family Medicine  Why:  hospital follow-up   Contact information:  [address] [place] [address]  [phone]             RADHA Briseno In 2 weeks.    Specialty:  Cardiology  Why:  hospital follow-up and establish care   Contact information:  [facility] [address]  [facility] [address]  [phone]                 Patient Instructions:     Diet Cardiac     Diet Low Sodium, 2gm     Activity as tolerated     Call MD for:  temperature >100.4     Call MD for:  persistent nausea and vomiting or diarrhea     Call MD for:  severe uncontrolled pain     Call MD for:   difficulty breathing or increased cough     Call MD for:  severe persistent headache     Call MD for:  persistent dizziness, light-headedness, or visual disturbances     Call MD for:  increased confusion or weakness         Significant Diagnostic Studies: Labs:   BMP:   Recent Labs  Lab 11/25/17  0553 11/26/17  0739   GLU 76  76 85     140 139   K 4.0  4.0 3.4*   *  112* 107   CO2 19*  19* 21*   BUN 37*  37* 21   CREATININE 1.8*  1.8* 1.4   CALCIUM 8.6*  8.6* 9.0   MG 1.9  1.9 1.6   , CMP   Recent Labs  Lab 11/25/17  0553 11/26/17  0739     140 139   K 4.0  4.0 3.4*   *  112* 107   CO2 19*  19* 21*   GLU 76  76 85   BUN 37*  37* 21   CREATININE 1.8*  1.8* 1.4   CALCIUM 8.6*  8.6* 9.0   PROT 6.4  6.4 7.2   ALBUMIN 2.7*  2.7* 3.0*   BILITOT 1.0  1.0 0.8   ALKPHOS 51*  51* 59   AST 16  16 17   ALT 14  14 16   ANIONGAP 9  9 11   ESTGFRAFRICA 38*  38* 51*   EGFRNONAA 33*  33* 45*   , CBC   Recent Labs  Lab 11/25/17  0553 11/26/17  0740   WBC 8.69  8.69 8.21   HGB 11.6*  11.6* 13.7*   HCT 34.8*  34.8* 40.4   *  114* 140*    and All labs within the past 24 hours have been reviewed    Pending Diagnostic Studies:     None         Medications:  Reconciled Home Medications:   Current Discharge Medication List      START taking these medications    Details   amLODIPine (NORVASC) 5 MG tablet Take 2 tablets (10 mg total) by mouth once daily.  Qty: 60 tablet, Refills: 0         CONTINUE these medications which have CHANGED    Details   aspirin (ECOTRIN) 81 MG EC tablet Take 1 tablet (81 mg total) by mouth once daily.  Refills: 0         CONTINUE these medications which have NOT CHANGED    Details   atorvastatin (LIPITOR) 40 MG tablet Take 1 tablet (40 mg total) by mouth once daily.  Qty: 30 tablet, Refills: 11         STOP taking these medications       hydrochlorothiazide (HYDRODIURIL) 25 MG tablet Comments:   Reason for Stopping:         lisinopril (PRINIVIL,ZESTRIL)  20 MG tablet Comments:   Reason for Stopping:               Indwelling Lines/Drains at time of discharge:   Lines/Drains/Airways          No matching active lines, drains, or airways          Time spent on the discharge of patient: 45 minutes  Patient was seen and examined on the date of discharge and determined to be suitable for discharge.         Rocio Michaels NP  Department of Hospital Medicine  [facility]

## 2017-11-28 NOTE — PLAN OF CARE
11/26/17 1455   Final Note   Assessment Type Final Discharge Note   Discharge Disposition Home   Right Care Referral Info   Post Acute Recommendation No Care

## 2017-11-29 LAB
BACTERIA BLD CULT: NORMAL
BACTERIA BLD CULT: NORMAL

## 2020-07-29 ENCOUNTER — LAB VISIT (OUTPATIENT)
Dept: LAB | Facility: HOSPITAL | Age: 85
End: 2020-07-29
Attending: NURSE PRACTITIONER
Payer: MEDICARE

## 2020-07-29 DIAGNOSIS — Z13.0 SCREENING FOR IRON DEFICIENCY ANEMIA: ICD-10-CM

## 2020-07-29 DIAGNOSIS — I10 ESSENTIAL HYPERTENSION, MALIGNANT: Primary | ICD-10-CM

## 2020-07-29 DIAGNOSIS — E55.9 VITAMIN D DEFICIENCY DISEASE: ICD-10-CM

## 2020-07-29 DIAGNOSIS — Z12.5 SPECIAL SCREENING, PROSTATE CANCER: ICD-10-CM

## 2020-07-29 DIAGNOSIS — R31.21 ASYMPTOMATIC MICROSCOPIC HEMATURIA: ICD-10-CM

## 2020-07-29 DIAGNOSIS — Z13.220 SCREENING FOR LIPOID DISORDERS: ICD-10-CM

## 2020-07-29 LAB
ALBUMIN SERPL BCP-MCNC: 3.4 G/DL (ref 3.5–5.2)
ALP SERPL-CCNC: 51 U/L (ref 55–135)
ALT SERPL W/O P-5'-P-CCNC: 15 U/L (ref 10–44)
ANION GAP SERPL CALC-SCNC: 7 MMOL/L (ref 8–16)
AST SERPL-CCNC: 14 U/L (ref 10–40)
BACTERIA #/AREA URNS HPF: ABNORMAL /HPF
BASOPHILS # BLD AUTO: 0.02 K/UL (ref 0–0.2)
BASOPHILS NFR BLD: 0.4 % (ref 0–1.9)
BILIRUB SERPL-MCNC: 0.9 MG/DL (ref 0.1–1)
BILIRUB UR QL STRIP: NEGATIVE
BUN SERPL-MCNC: 22 MG/DL (ref 10–30)
CALCIUM SERPL-MCNC: 9.5 MG/DL (ref 8.7–10.5)
CHLORIDE SERPL-SCNC: 102 MMOL/L (ref 95–110)
CHOLEST SERPL-MCNC: 138 MG/DL (ref 120–199)
CHOLEST/HDLC SERPL: 2.1 {RATIO} (ref 2–5)
CLARITY UR: ABNORMAL
CO2 SERPL-SCNC: 30 MMOL/L (ref 23–29)
COLOR UR: YELLOW
COMPLEXED PSA SERPL-MCNC: 1.1 NG/ML (ref 0–4)
CREAT SERPL-MCNC: 1.7 MG/DL (ref 0.5–1.4)
DIFFERENTIAL METHOD: ABNORMAL
EOSINOPHIL # BLD AUTO: 0.1 K/UL (ref 0–0.5)
EOSINOPHIL NFR BLD: 1.7 % (ref 0–8)
ERYTHROCYTE [DISTWIDTH] IN BLOOD BY AUTOMATED COUNT: 12.9 % (ref 11.5–14.5)
EST. GFR  (AFRICAN AMERICAN): 39.9 ML/MIN/1.73 M^2
EST. GFR  (NON AFRICAN AMERICAN): 34.5 ML/MIN/1.73 M^2
GLUCOSE SERPL-MCNC: 89 MG/DL (ref 70–110)
GLUCOSE UR QL STRIP: NEGATIVE
HCT VFR BLD AUTO: 38.2 % (ref 40–54)
HDLC SERPL-MCNC: 65 MG/DL (ref 40–75)
HDLC SERPL: 47.1 % (ref 20–50)
HGB BLD-MCNC: 12.5 G/DL (ref 14–18)
HGB UR QL STRIP: NEGATIVE
HYALINE CASTS #/AREA URNS LPF: 4 /LPF
IMM GRANULOCYTES # BLD AUTO: 0.01 K/UL (ref 0–0.04)
IMM GRANULOCYTES NFR BLD AUTO: 0.2 % (ref 0–0.5)
KETONES UR QL STRIP: NEGATIVE
LDLC SERPL CALC-MCNC: 63.6 MG/DL (ref 63–159)
LEUKOCYTE ESTERASE UR QL STRIP: ABNORMAL
LYMPHOCYTES # BLD AUTO: 1.2 K/UL (ref 1–4.8)
LYMPHOCYTES NFR BLD: 26.6 % (ref 18–48)
MCH RBC QN AUTO: 31.9 PG (ref 27–31)
MCHC RBC AUTO-ENTMCNC: 32.7 G/DL (ref 32–36)
MCV RBC AUTO: 97 FL (ref 82–98)
MICROSCOPIC COMMENT: ABNORMAL
MONOCYTES # BLD AUTO: 0.3 K/UL (ref 0.3–1)
MONOCYTES NFR BLD: 5.8 % (ref 4–15)
NEUTROPHILS # BLD AUTO: 3 K/UL (ref 1.8–7.7)
NEUTROPHILS NFR BLD: 65.3 % (ref 38–73)
NITRITE UR QL STRIP: NEGATIVE
NONHDLC SERPL-MCNC: 73 MG/DL
NRBC BLD-RTO: 0 /100 WBC
PH UR STRIP: 7 [PH] (ref 5–8)
PLATELET # BLD AUTO: 170 K/UL (ref 150–350)
PMV BLD AUTO: 9.3 FL (ref 9.2–12.9)
POTASSIUM SERPL-SCNC: 4 MMOL/L (ref 3.5–5.1)
PROT SERPL-MCNC: 8.3 G/DL (ref 6–8.4)
PROT UR QL STRIP: ABNORMAL
RBC # BLD AUTO: 3.92 M/UL (ref 4.6–6.2)
RBC #/AREA URNS HPF: 72 /HPF (ref 0–4)
SODIUM SERPL-SCNC: 139 MMOL/L (ref 136–145)
SP GR UR STRIP: 1.02 (ref 1–1.03)
SQUAMOUS #/AREA URNS HPF: 1 /HPF
T4 FREE SERPL-MCNC: 0.81 NG/DL (ref 0.71–1.51)
TRIGL SERPL-MCNC: 47 MG/DL (ref 30–150)
TSH SERPL DL<=0.005 MIU/L-ACNC: 1.87 UIU/ML (ref 0.4–4)
URN SPEC COLLECT METH UR: ABNORMAL
UROBILINOGEN UR STRIP-ACNC: 1 EU/DL
WBC # BLD AUTO: 4.62 K/UL (ref 3.9–12.7)
WBC #/AREA URNS HPF: >100 /HPF (ref 0–5)

## 2020-07-29 PROCEDURE — 82306 VITAMIN D 25 HYDROXY: CPT

## 2020-07-29 PROCEDURE — 85025 COMPLETE CBC W/AUTO DIFF WBC: CPT

## 2020-07-29 PROCEDURE — 80061 LIPID PANEL: CPT

## 2020-07-29 PROCEDURE — 81000 URINALYSIS NONAUTO W/SCOPE: CPT

## 2020-07-29 PROCEDURE — 80053 COMPREHEN METABOLIC PANEL: CPT

## 2020-07-29 PROCEDURE — 36415 COLL VENOUS BLD VENIPUNCTURE: CPT

## 2020-07-29 PROCEDURE — 84439 ASSAY OF FREE THYROXINE: CPT

## 2020-07-29 PROCEDURE — 84443 ASSAY THYROID STIM HORMONE: CPT

## 2020-07-29 PROCEDURE — 84153 ASSAY OF PSA TOTAL: CPT

## 2020-07-30 LAB — 25(OH)D3+25(OH)D2 SERPL-MCNC: 31 NG/ML (ref 30–96)

## 2020-08-15 ENCOUNTER — HOSPITAL ENCOUNTER (INPATIENT)
Facility: HOSPITAL | Age: 85
LOS: 4 days | Discharge: HOME HEALTH | DRG: 281 | End: 2020-08-19
Attending: EMERGENCY MEDICINE | Admitting: INTERNAL MEDICINE
Payer: MEDICARE

## 2020-08-15 DIAGNOSIS — I67.89 CEREBRAL MICROVASCULAR DISEASE: ICD-10-CM

## 2020-08-15 DIAGNOSIS — I21.4 NSTEMI (NON-ST ELEVATED MYOCARDIAL INFARCTION): ICD-10-CM

## 2020-08-15 DIAGNOSIS — E86.1 HYPOVOLEMIA: ICD-10-CM

## 2020-08-15 DIAGNOSIS — R79.89 ELEVATED TROPONIN: ICD-10-CM

## 2020-08-15 DIAGNOSIS — R53.1 WEAKNESS: ICD-10-CM

## 2020-08-15 DIAGNOSIS — E86.0 DEHYDRATION: ICD-10-CM

## 2020-08-15 DIAGNOSIS — R55 SYNCOPE, UNSPECIFIED SYNCOPE TYPE: Primary | ICD-10-CM

## 2020-08-15 DIAGNOSIS — R55 SYNCOPE: ICD-10-CM

## 2020-08-15 LAB
ALBUMIN SERPL BCP-MCNC: 3.5 G/DL (ref 3.5–5.2)
ALP SERPL-CCNC: 55 U/L (ref 55–135)
ALT SERPL W/O P-5'-P-CCNC: 29 U/L (ref 10–44)
ANION GAP SERPL CALC-SCNC: 7 MMOL/L (ref 8–16)
AST SERPL-CCNC: 77 U/L (ref 10–40)
BASOPHILS # BLD AUTO: 0.04 K/UL (ref 0–0.2)
BASOPHILS NFR BLD: 0.7 % (ref 0–1.9)
BILIRUB SERPL-MCNC: 1.1 MG/DL (ref 0.1–1)
BUN SERPL-MCNC: 42 MG/DL (ref 10–30)
CALCIUM SERPL-MCNC: 9.4 MG/DL (ref 8.7–10.5)
CHLORIDE SERPL-SCNC: 104 MMOL/L (ref 95–110)
CO2 SERPL-SCNC: 27 MMOL/L (ref 23–29)
CREAT SERPL-MCNC: 2.3 MG/DL (ref 0.5–1.4)
DIFFERENTIAL METHOD: ABNORMAL
EOSINOPHIL # BLD AUTO: 0.1 K/UL (ref 0–0.5)
EOSINOPHIL NFR BLD: 1.3 % (ref 0–8)
ERYTHROCYTE [DISTWIDTH] IN BLOOD BY AUTOMATED COUNT: 13.1 % (ref 11.5–14.5)
EST. GFR  (AFRICAN AMERICAN): 27.7 ML/MIN/1.73 M^2
EST. GFR  (NON AFRICAN AMERICAN): 23.9 ML/MIN/1.73 M^2
GLUCOSE SERPL-MCNC: 181 MG/DL (ref 70–110)
HCT VFR BLD AUTO: 38.5 % (ref 40–54)
HGB BLD-MCNC: 12.5 G/DL (ref 14–18)
IMM GRANULOCYTES # BLD AUTO: 0.02 K/UL (ref 0–0.04)
IMM GRANULOCYTES NFR BLD AUTO: 0.3 % (ref 0–0.5)
LYMPHOCYTES # BLD AUTO: 1.5 K/UL (ref 1–4.8)
LYMPHOCYTES NFR BLD: 25 % (ref 18–48)
MAGNESIUM SERPL-MCNC: 2.6 MG/DL (ref 1.6–2.6)
MCH RBC QN AUTO: 31.7 PG (ref 27–31)
MCHC RBC AUTO-ENTMCNC: 32.5 G/DL (ref 32–36)
MCV RBC AUTO: 98 FL (ref 82–98)
MONOCYTES # BLD AUTO: 0.4 K/UL (ref 0.3–1)
MONOCYTES NFR BLD: 6.8 % (ref 4–15)
NEUTROPHILS # BLD AUTO: 4.1 K/UL (ref 1.8–7.7)
NEUTROPHILS NFR BLD: 65.9 % (ref 38–73)
NRBC BLD-RTO: 0 /100 WBC
NT-PROBNP: 249 PG/ML (ref 5–1800)
PHOSPHATE SERPL-MCNC: 3.5 MG/DL (ref 2.7–4.5)
PLATELET # BLD AUTO: 140 K/UL (ref 150–350)
PMV BLD AUTO: 9.4 FL (ref 9.2–12.9)
POTASSIUM SERPL-SCNC: 3.6 MMOL/L (ref 3.5–5.1)
PROT SERPL-MCNC: 8.7 G/DL (ref 6–8.4)
RBC # BLD AUTO: 3.94 M/UL (ref 4.6–6.2)
SODIUM SERPL-SCNC: 138 MMOL/L (ref 136–145)
TROPONIN I SERPL DL<=0.01 NG/ML-MCNC: 0.07 NG/ML (ref 0–0.03)
WBC # BLD AUTO: 6.15 K/UL (ref 3.9–12.7)

## 2020-08-15 PROCEDURE — 84100 ASSAY OF PHOSPHORUS: CPT

## 2020-08-15 PROCEDURE — 83735 ASSAY OF MAGNESIUM: CPT

## 2020-08-15 PROCEDURE — 80053 COMPREHEN METABOLIC PANEL: CPT

## 2020-08-15 PROCEDURE — 93005 ELECTROCARDIOGRAM TRACING: CPT

## 2020-08-15 PROCEDURE — 36415 COLL VENOUS BLD VENIPUNCTURE: CPT

## 2020-08-15 PROCEDURE — 99285 EMERGENCY DEPT VISIT HI MDM: CPT | Mod: 25

## 2020-08-15 PROCEDURE — 84484 ASSAY OF TROPONIN QUANT: CPT

## 2020-08-15 PROCEDURE — 83880 ASSAY OF NATRIURETIC PEPTIDE: CPT

## 2020-08-15 PROCEDURE — 96361 HYDRATE IV INFUSION ADD-ON: CPT | Mod: 59

## 2020-08-15 PROCEDURE — 85025 COMPLETE CBC W/AUTO DIFF WBC: CPT

## 2020-08-15 PROCEDURE — 93010 EKG 12-LEAD: ICD-10-PCS | Mod: ,,, | Performed by: INTERNAL MEDICINE

## 2020-08-15 PROCEDURE — 25000003 PHARM REV CODE 250: Performed by: EMERGENCY MEDICINE

## 2020-08-15 PROCEDURE — 11000001 HC ACUTE MED/SURG PRIVATE ROOM

## 2020-08-15 PROCEDURE — 93010 ELECTROCARDIOGRAM REPORT: CPT | Mod: ,,, | Performed by: INTERNAL MEDICINE

## 2020-08-15 RX ORDER — AMLODIPINE BESYLATE 10 MG/1
1 TABLET ORAL DAILY
COMMUNITY
Start: 2020-07-15

## 2020-08-15 RX ORDER — NAPROXEN SODIUM 220 MG/1
324 TABLET, FILM COATED ORAL
Status: COMPLETED | OUTPATIENT
Start: 2020-08-16 | End: 2020-08-16

## 2020-08-15 RX ORDER — QUETIAPINE FUMARATE 100 MG/1
1 TABLET, FILM COATED ORAL DAILY
COMMUNITY
Start: 2020-07-09

## 2020-08-15 RX ORDER — QUETIAPINE FUMARATE 200 MG/1
1 TABLET, FILM COATED ORAL NIGHTLY
COMMUNITY
Start: 2020-07-09

## 2020-08-15 RX ADMIN — SODIUM CHLORIDE 500 ML: 0.9 INJECTION, SOLUTION INTRAVENOUS at 11:08

## 2020-08-15 RX ADMIN — SODIUM CHLORIDE 500 ML: 9 INJECTION, SOLUTION INTRAVENOUS at 11:08

## 2020-08-16 PROBLEM — I21.4 NSTEMI (NON-ST ELEVATED MYOCARDIAL INFARCTION): Status: ACTIVE | Noted: 2020-08-16

## 2020-08-16 PROBLEM — R55 SYNCOPE: Status: ACTIVE | Noted: 2020-08-16

## 2020-08-16 PROBLEM — I77.9 CAROTID ARTERY DISEASE: Chronic | Status: ACTIVE | Noted: 2020-08-16

## 2020-08-16 LAB
BACTERIA #/AREA URNS HPF: ABNORMAL /HPF
BILIRUB UR QL STRIP: NEGATIVE
CLARITY UR: ABNORMAL
COLOR UR: YELLOW
GLUCOSE UR QL STRIP: NEGATIVE
HGB UR QL STRIP: ABNORMAL
HYALINE CASTS #/AREA URNS LPF: 8 /LPF
KETONES UR QL STRIP: NEGATIVE
LEUKOCYTE ESTERASE UR QL STRIP: ABNORMAL
MICROSCOPIC COMMENT: ABNORMAL
NITRITE UR QL STRIP: NEGATIVE
PH UR STRIP: 5 [PH] (ref 5–8)
PROT UR QL STRIP: ABNORMAL
RBC #/AREA URNS HPF: 4 /HPF (ref 0–4)
SARS-COV-2 RDRP RESP QL NAA+PROBE: NEGATIVE
SP GR UR STRIP: 1.02 (ref 1–1.03)
SQUAMOUS #/AREA URNS HPF: 1 /HPF
TROPONIN I SERPL DL<=0.01 NG/ML-MCNC: 0.22 NG/ML (ref 0–0.03)
TROPONIN I SERPL DL<=0.01 NG/ML-MCNC: 0.53 NG/ML (ref 0–0.03)
URN SPEC COLLECT METH UR: ABNORMAL
UROBILINOGEN UR STRIP-ACNC: 1 EU/DL
WBC #/AREA URNS HPF: 2 /HPF (ref 0–5)

## 2020-08-16 PROCEDURE — 25000003 PHARM REV CODE 250: Performed by: EMERGENCY MEDICINE

## 2020-08-16 PROCEDURE — 36415 COLL VENOUS BLD VENIPUNCTURE: CPT

## 2020-08-16 PROCEDURE — 27000221 HC OXYGEN, UP TO 24 HOURS

## 2020-08-16 PROCEDURE — 93010 ELECTROCARDIOGRAM REPORT: CPT | Mod: ,,, | Performed by: INTERNAL MEDICINE

## 2020-08-16 PROCEDURE — 63600175 PHARM REV CODE 636 W HCPCS: Performed by: INTERNAL MEDICINE

## 2020-08-16 PROCEDURE — 84484 ASSAY OF TROPONIN QUANT: CPT | Mod: 91

## 2020-08-16 PROCEDURE — 94761 N-INVAS EAR/PLS OXIMETRY MLT: CPT

## 2020-08-16 PROCEDURE — 93005 ELECTROCARDIOGRAM TRACING: CPT

## 2020-08-16 PROCEDURE — 96365 THER/PROPH/DIAG IV INF INIT: CPT | Mod: 59 | Performed by: EMERGENCY MEDICINE

## 2020-08-16 PROCEDURE — U0002 COVID-19 LAB TEST NON-CDC: HCPCS

## 2020-08-16 PROCEDURE — 84484 ASSAY OF TROPONIN QUANT: CPT

## 2020-08-16 PROCEDURE — 25000003 PHARM REV CODE 250: Performed by: INTERNAL MEDICINE

## 2020-08-16 PROCEDURE — 93010 EKG 12-LEAD: ICD-10-PCS | Mod: ,,, | Performed by: INTERNAL MEDICINE

## 2020-08-16 PROCEDURE — 96361 HYDRATE IV INFUSION ADD-ON: CPT | Performed by: EMERGENCY MEDICINE

## 2020-08-16 PROCEDURE — 99900035 HC TECH TIME PER 15 MIN (STAT)

## 2020-08-16 PROCEDURE — G0378 HOSPITAL OBSERVATION PER HR: HCPCS

## 2020-08-16 PROCEDURE — 81000 URINALYSIS NONAUTO W/SCOPE: CPT

## 2020-08-16 PROCEDURE — 11000001 HC ACUTE MED/SURG PRIVATE ROOM

## 2020-08-16 PROCEDURE — 96372 THER/PROPH/DIAG INJ SC/IM: CPT | Mod: 59 | Performed by: EMERGENCY MEDICINE

## 2020-08-16 RX ORDER — METOPROLOL TARTRATE 25 MG/1
25 TABLET, FILM COATED ORAL 2 TIMES DAILY
Status: DISCONTINUED | OUTPATIENT
Start: 2020-08-16 | End: 2020-08-18

## 2020-08-16 RX ORDER — SODIUM CHLORIDE 0.9 % (FLUSH) 0.9 %
10 SYRINGE (ML) INJECTION
Status: DISCONTINUED | OUTPATIENT
Start: 2020-08-16 | End: 2020-08-16

## 2020-08-16 RX ORDER — SODIUM CHLORIDE 9 MG/ML
INJECTION, SOLUTION INTRAVENOUS CONTINUOUS
Status: DISCONTINUED | OUTPATIENT
Start: 2020-08-16 | End: 2020-08-17

## 2020-08-16 RX ORDER — ZIPRASIDONE MESYLATE 20 MG/ML
10 INJECTION, POWDER, LYOPHILIZED, FOR SOLUTION INTRAMUSCULAR ONCE
Status: COMPLETED | OUTPATIENT
Start: 2020-08-16 | End: 2020-08-16

## 2020-08-16 RX ORDER — ENOXAPARIN SODIUM 100 MG/ML
30 INJECTION SUBCUTANEOUS EVERY 24 HOURS
Status: DISCONTINUED | OUTPATIENT
Start: 2020-08-16 | End: 2020-08-19 | Stop reason: HOSPADM

## 2020-08-16 RX ORDER — ATORVASTATIN CALCIUM 40 MG/1
40 TABLET, FILM COATED ORAL NIGHTLY
Status: DISCONTINUED | OUTPATIENT
Start: 2020-08-16 | End: 2020-08-19 | Stop reason: HOSPADM

## 2020-08-16 RX ORDER — ASPIRIN 81 MG/1
81 TABLET ORAL DAILY
Status: DISCONTINUED | OUTPATIENT
Start: 2020-08-16 | End: 2020-08-19 | Stop reason: HOSPADM

## 2020-08-16 RX ORDER — ZIPRASIDONE MESYLATE 20 MG/ML
10 INJECTION, POWDER, LYOPHILIZED, FOR SOLUTION INTRAMUSCULAR ONCE AS NEEDED
Status: DISCONTINUED | OUTPATIENT
Start: 2020-08-16 | End: 2020-08-19 | Stop reason: HOSPADM

## 2020-08-16 RX ORDER — QUETIAPINE FUMARATE 100 MG/1
100 TABLET, FILM COATED ORAL DAILY
Status: DISCONTINUED | OUTPATIENT
Start: 2020-08-16 | End: 2020-08-19 | Stop reason: HOSPADM

## 2020-08-16 RX ORDER — CLOPIDOGREL BISULFATE 75 MG/1
75 TABLET ORAL DAILY
Status: DISCONTINUED | OUTPATIENT
Start: 2020-08-16 | End: 2020-08-19 | Stop reason: HOSPADM

## 2020-08-16 RX ADMIN — SODIUM CHLORIDE: 0.9 INJECTION, SOLUTION INTRAVENOUS at 12:08

## 2020-08-16 RX ADMIN — CLOPIDOGREL 75 MG: 75 TABLET, FILM COATED ORAL at 09:08

## 2020-08-16 RX ADMIN — METOPROLOL TARTRATE 25 MG: 25 TABLET, FILM COATED ORAL at 09:08

## 2020-08-16 RX ADMIN — CEFTRIAXONE SODIUM 1 G: 1 INJECTION, POWDER, FOR SOLUTION INTRAMUSCULAR; INTRAVENOUS at 12:08

## 2020-08-16 RX ADMIN — SODIUM CHLORIDE: 0.9 INJECTION, SOLUTION INTRAVENOUS at 09:08

## 2020-08-16 RX ADMIN — ZIPRASIDONE MESYLATE 10 MG: 20 INJECTION, POWDER, LYOPHILIZED, FOR SOLUTION INTRAMUSCULAR at 06:08

## 2020-08-16 RX ADMIN — ASPIRIN 81 MG 324 MG: 81 TABLET ORAL at 12:08

## 2020-08-16 RX ADMIN — ENOXAPARIN SODIUM 30 MG: 30 INJECTION SUBCUTANEOUS at 09:08

## 2020-08-16 RX ADMIN — ATORVASTATIN CALCIUM 40 MG: 40 TABLET, FILM COATED ORAL at 08:08

## 2020-08-16 RX ADMIN — METOPROLOL TARTRATE 25 MG: 25 TABLET, FILM COATED ORAL at 08:08

## 2020-08-16 RX ADMIN — ASPIRIN 81 MG: 81 TABLET, COATED ORAL at 09:08

## 2020-08-16 RX ADMIN — QUETIAPINE FUMARATE 100 MG: 100 TABLET ORAL at 12:08

## 2020-08-16 NOTE — PLAN OF CARE
Pt started IV fluids and antibiotic and PO meds, tolerated well. Pt was placed on a clear liquid diet, tolerated well, advanced diet to a full liquid. Pt pulled out his IV in his left wrist. New IV inserted in right forearm. Pt agitated, trying to get out of bed without assistance. Removing clothing. Notified Dr. Vasquez. New orders carried out.

## 2020-08-16 NOTE — CARE UPDATE
08/16/20 1036   Patient Assessment/Suction   Level of Consciousness (AVPU) alert   Respiratory Effort Unlabored;Normal   Expansion/Accessory Muscles/Retractions no use of accessory muscles   PRE-TX-O2   O2 Device (Oxygen Therapy) nasal cannula   $ Is the patient on Low Flow Oxygen? Yes   Flow (L/min) 2   Oxygen Concentration (%) 28   SpO2 95 %   Pulse Oximetry Type Intermittent   $ Pulse Oximetry - Multiple Charge Pulse Oximetry - Multiple   Pulse 70   Respiratory Evaluation   $ Care Plan Tech Time 15 min   Evaluation For New Orders

## 2020-08-16 NOTE — CONSULTS
Cardiology Consultation Note  [facility]    Today's Date:  8/16/2020  Patient Name: Guido Michaels    MRN: [id number]    Code Status: Full Code     Attending Physician: Pedro Bird MD   Consulting Physician: NILSA Camilo MD    Reason for Consult:  Syncope.  Elevated troponin I   Acute renal failure secondary to prerenal azotemia.    Subjective:   The patient is a 89-year-old gentleman with multiple chronic comorbidities.  This morning, the patient was being moved from his bed at which time he became disoriented and developed near-syncope.    In the emergency department, the patient was noted to be confused given his underlying dementia.    Past Medical History:  Past Medical History:   Diagnosis Date    Dementia     Diastolic dysfunction without heart failure 2014    History of ischemic stroke without residual deficits 2014    s/p tPA therapy    Hyperlipidemia     Hypertension        Past Surgical History:   Procedure Laterality Date    APPENDECTOMY      HERNIA REPAIR         History reviewed. No pertinent family history.    Social History     Socioeconomic History    Marital status:      Spouse name: Not on file    Number of children: Not on file    Years of education: Not on file    Highest education level: Not on file   Occupational History    Not on file   Social Needs    Financial resource strain: Not on file    Food insecurity     Worry: Not on file     Inability: Not on file    Transportation needs     Medical: Not on file     Non-medical: Not on file   Tobacco Use    Smoking status: Never Smoker    Smokeless tobacco: Never Used   Substance and Sexual Activity    Alcohol use: No    Drug use: No    Sexual activity: Not on file   Lifestyle    Physical activity     Days per week: Not on file     Minutes per session: Not on file    Stress: Not on file   Relationships    Social connections     Talks on phone: Not on file     Gets together: Not on file     Attends  Yazidi service: Not on file     Active member of club or organization: Not on file     Attends meetings of clubs or organizations: Not on file     Relationship status: Not on file   Other Topics Concern    Not on file   Social History Narrative    Not on file       Medications:  Current Facility-Administered Medications   Medication Dose Route Frequency Provider Last Rate Last Dose    sodium chloride 0.9% flush 10 mL  10 mL Intravenous PRN David Resendiz MD           Allergies:  Review of patient's allergies indicates:  No Known Allergies     Review of Systems   Constitution: Positive for decreased appetite and weight loss.   Neurological: Positive for difficulty with concentration, loss of balance and weakness.   All other systems reviewed and are negative.      Vital Signs (Most Recent):  Temp: 97.6 °F (36.4 °C) (08/16/20 0654)  Pulse: 87 (08/16/20 0654)  Resp: (!) 22 (08/16/20 0654)  BP: (!) 150/90 (08/16/20 0654)  SpO2: 96 % (08/16/20 0654) Vital Signs (24h Range):  Temp:  [97.6 °F (36.4 °C)-98.6 °F (37 °C)] 97.6 °F (36.4 °C)  Pulse:  [] 87  Resp:  [14-23] 22  SpO2:  [86 %-96 %] 96 %  BP: ()/(66-93) 150/90     Weight: 57.7 kg (127 lb 1.6 oz)  Body mass index is 18.77 kg/m².    SpO2: 96 %  O2 Device (Oxygen Therapy): nasal cannula      Intake/Output Summary (Last 24 hours) at 8/16/2020 0816  Last data filed at 8/16/2020 0241  Gross per 24 hour   Intake 500 ml   Output --   Net 500 ml       Physical Exam  General:  Patient appears confused; emaciated; and dehydrated.  Heent:  Oral mucosa is dry.  Lungs:  Clear to auscultation; poor inspiratory effort.  Heart:  Regular rate rhythm.  Extremities:  Without edema.    Labs:   CMP   Recent Labs   Lab 08/15/20  2314      K 3.6      CO2 27   *   BUN 42*   CREATININE 2.3*   CALCIUM 9.4   PROT 8.7*   ALBUMIN 3.5   BILITOT 1.1*   ALKPHOS 55   AST 77*   ALT 29   ANIONGAP 7*   ESTGFRAFRICA 27.7*   EGFRNONAA 23.9*   , CBC   Recent Labs    Lab 08/15/20  2314   WBC 6.15   HGB 12.5*   HCT 38.5*   *    and Troponin   Recent Labs   Lab 08/15/20  2314 08/16/20  0115   TROPONINI 0.069* 0.222*     Telemetry:  Sinus rhythm with first-degree AV block.    Imaging:      Results for orders placed or performed during the hospital encounter of 08/15/20   EKG 12-lead    Narrative    Test Reason : R07.9,    Vent. Rate : 111 BPM     Atrial Rate : 111 BPM     P-R Int : 220 ms          QRS Dur : 078 ms      QT Int : 374 ms       P-R-T Axes : 075 -72 051 degrees     QTc Int : 508 ms    Sinus tachycardia with 1st degree A-V block with Premature atrial complexes  Left axis deviation  Septal infarct (cited on or before 24-NOV-2017)  Abnormal ECG  When compared with ECG of 15-AUG-2020 22:50,  Previous ECG has undetermined rhythm, needs review    Referred By: [email or web address]   SELF           Confirmed By:      Head CT:  Chronic ischemic changes; no acute intra-cranial process.        Assessment and Plan:     Patient Active Problem List   Diagnosis    Hypovolemia    Electrolyte abnormality    Hypokalemia    Abnormal coagulation profile    Hyperbilirubinemia    Cerebral microvascular disease    Benign hypertension    tPA/rtPA given at another facility within 24 hrs prior to admission    Confusion associated with infection    UTI (urinary tract infection)    Diastolic dysfunction    Pyelonephritis    Acute renal failure    Sepsis    Essential hypertension    History of ischemic stroke without residual deficits    Syncope    Carotid artery disease    NSTEMI (non-ST elevated myocardial infarction)     Plan:  1. Asa 81 mg qd.  2. Plavix 75 mg qd  3. Metoprolol 25 mg bid  4. Echocardiogram:  Rule out structural and valvular disease  5. Carotid ultrasound:  Known history of carotid artery disease  6. EkG  7. Telemetry monitoring  8. Re-introduce amlodipine once blood pressure is better gaged.  9. Liberalize fluids:  IV fluids:  Normal saline at 125  cc/hour.    ** In terms of the patient's non ST segment elevated MI, my recommendation will be conservative management based on his chronic comorbidities.  In addition, he has some renal insufficiency which will likely improve with hydration.  He is not complaining of chest pain.  Continue assess the patient over the next several days.

## 2020-08-16 NOTE — ED PROVIDER NOTES
"Encounter Date: 8/15/2020       History     Chief Complaint   Patient presents with    Loss of Consciousness     Family reports they were assisting the patient to bed when he "went limp". Family reprots pt is back to baseline. They report he has hx of stroke and dementia. Pt denies pain/complaints at this time. EMS reports pt was hypotensive in route.      Pt is a 89 y/o M who is a poor historian secondary to dementia who presents with concern for a brief episode of unresponsiveness at home.  EMS reported that the family called as he was weak and went limp for several seconds as the family was attempting to put him in bed. Pt was back to baseline mental status upon EMS arrival according to the family.          Review of patient's allergies indicates:  No Known Allergies  Past Medical History:   Diagnosis Date    Dementia     Diastolic dysfunction without heart failure 2014    History of ischemic stroke without residual deficits 2014    s/p tPA therapy    Hyperlipidemia     Hypertension      Past Surgical History:   Procedure Laterality Date    APPENDECTOMY      HERNIA REPAIR       History reviewed. No pertinent family history.  Social History     Tobacco Use    Smoking status: Never Smoker    Smokeless tobacco: Never Used   Substance Use Topics    Alcohol use: No    Drug use: No     Review of Systems   Unable to perform ROS: Dementia       Physical Exam     Initial Vitals   BP Pulse Resp Temp SpO2   08/15/20 2249 08/15/20 2250 08/15/20 2250 08/15/20 2253 08/15/20 2250   90/66 104 (!) 21 98.6 °F (37 °C) (!) 89 %      MAP       --                Physical Exam    Nursing note and vitals reviewed.  Constitutional: He appears well-developed. He is not diaphoretic. No distress.   Frail and elderly AAM   HENT:   Head: Normocephalic and atraumatic.   Right Ear: External ear normal.   Left Ear: External ear normal.   Mouth/Throat: Oropharynx is clear and moist. No oropharyngeal exudate.   Eyes: Conjunctivae and " EOM are normal. Pupils are equal, round, and reactive to light. Right eye exhibits no discharge. Left eye exhibits no discharge. No scleral icterus.   Neck: Normal range of motion. Neck supple. No thyromegaly present. No tracheal deviation present. No JVD present.   Cardiovascular: Normal rate, regular rhythm, normal heart sounds and intact distal pulses. Exam reveals no gallop and no friction rub.    No murmur heard.  Pulmonary/Chest: Breath sounds normal. No stridor. No respiratory distress. He has no wheezes. He has no rhonchi. He has no rales. He exhibits no tenderness.   Abdominal: Soft. He exhibits no distension and no mass. There is no abdominal tenderness. There is no rebound and no guarding.   Musculoskeletal: Normal range of motion. No tenderness or edema.   Lymphadenopathy:     He has no cervical adenopathy.   Neurological: He is alert and oriented to person, place, and time. He has normal strength. GCS eye subscore is 4. GCS verbal subscore is 5. GCS motor subscore is 6.   ARMANDO with NGND's.  GCS of 14 due to confusion secondary to advanced dementia.  No facial droop or pronator drift present   Skin: Skin is warm and dry. Capillary refill takes less than 2 seconds. No rash noted. No erythema.   Psychiatric:   Pt can answer basic questions but will occasionally mumble incoherently         ED Course   Procedures  Labs Reviewed   CBC W/ AUTO DIFFERENTIAL - Abnormal; Notable for the following components:       Result Value    RBC 3.94 (*)     Hemoglobin 12.5 (*)     Hematocrit 38.5 (*)     Mean Corpuscular Hemoglobin 31.7 (*)     Platelets 140 (*)     All other components within normal limits   COMPREHENSIVE METABOLIC PANEL - Abnormal; Notable for the following components:    Glucose 181 (*)     BUN, Bld 42 (*)     Creatinine 2.3 (*)     Total Protein 8.7 (*)     Total Bilirubin 1.1 (*)     AST 77 (*)     Anion Gap 7 (*)     eGFR if  27.7 (*)     eGFR if non  23.9 (*)     All  other components within normal limits   TROPONIN I - Abnormal; Notable for the following components:    Troponin I 0.069 (*)     All other components within normal limits   URINALYSIS, REFLEX TO URINE CULTURE - Abnormal; Notable for the following components:    Appearance, UA Cloudy (*)     Protein, UA 1+ (*)     Occult Blood UA 2+ (*)     Leukocytes, UA Trace (*)     All other components within normal limits    Narrative:     Specimen Source->Urine   URINALYSIS MICROSCOPIC - Abnormal; Notable for the following components:    Hyaline Casts, UA 8 (*)     All other components within normal limits    Narrative:     Specimen Source->Urine   TROPONIN I - Abnormal; Notable for the following components:    Troponin I 0.222 (*)     All other components within normal limits   NT-PRO NATRIURETIC PEPTIDE   MAGNESIUM   PHOSPHORUS   SARS-COV-2 RNA AMPLIFICATION, QUAL     EKG Readings: (Independently Interpreted)   Initial Reading: No STEMI.       Imaging Results          CT Head Without Contrast (In process)                X-Ray Chest AP Portable (In process)               Pt arrived alert, afebrile, non-toxic in appearance, in no acute respiratory distress with hypotension that resolved following administration of IVF's and SpO2 in the low 90's but no respiratory distress or increased WOB throughout ED observation  EKG revealed no acute findings concerning for ischemia, arrhythmia, heart block or any pathology to warrant cardiology consultation.  Pt likely dehydrated given elevated BUN / Cr in conjunction with hypotension and dry mucous membranes.  Pt's became more coherent with normalization of BP following administration of IVF's.  Troponin noted to trend up as well and ASA given upon arrival.  Pt discussed with Dr. Bird and orders were placed for placement in observation for further evaluation and management.    David Resendiz MD                                               Clinical Impression:       ICD-10-CM  ICD-9-CM   1. Syncope, unspecified syncope type  R55 780.2   2. Dehydration  E86.0 276.51   3. Elevated troponin  R79.89 790.6             ED Disposition Condition    Observation                           David Resendiz MD  08/16/20 0509

## 2020-08-16 NOTE — H&P
"[facility]    History & Physical      Patient Name: Guido Michaels  MRN: [id number]  Admission Date: 8/15/2020  Attending Physician: Pedro Bird MD   Primary Care Provider: Vinicio Walter MD         Patient information was obtained from past medical records and ER records.     Subjective:     Principal Problem:<principal problem not specified>    Chief Complaint:   Chief Complaint   Patient presents with    Loss of Consciousness     Family reports they were assisting the patient to bed when he "went limp". Family reprots pt is back to baseline. They report he has hx of stroke and dementia. Pt denies pain/complaints at this time. EMS reports pt was hypotensive in route.         HPI: 89 y/o male with history of adv dementia, htn, hld, h/o cva with no significant deficts presented to the ed via ems after he was found to have a brief episode of loss of consciousness and back to his baseline in few minutes and found to be hypotensive enroute and further work up suggestive of evangelista and nstemi and further admitted on ivf along with acs protocol with cardiology consult    Past Medical History:   Diagnosis Date    Dementia     Diastolic dysfunction without heart failure 2014    History of ischemic stroke without residual deficits 2014    s/p tPA therapy    Hyperlipidemia     Hypertension        Past Surgical History:   Procedure Laterality Date    APPENDECTOMY      HERNIA REPAIR         Review of patient's allergies indicates:  No Known Allergies    No current facility-administered medications on file prior to encounter.      Current Outpatient Medications on File Prior to Encounter   Medication Sig    amLODIPine (NORVASC) 10 MG tablet Take 1 tablet by mouth once daily.    aspirin (ECOTRIN) 81 MG EC tablet Take 1 tablet (81 mg total) by mouth once daily.    atorvastatin (LIPITOR) 40 MG tablet Take 1 tablet (40 mg total) by mouth once daily.    QUEtiapine (SEROQUEL) 100 MG Tab Take 1 tablet by mouth " once daily.    QUEtiapine (SEROQUEL) 200 MG Tab Take 1 tablet by mouth every evening.     Family History     None        Tobacco Use    Smoking status: Never Smoker    Smokeless tobacco: Never Used   Substance and Sexual Activity    Alcohol use: No    Drug use: No    Sexual activity: Not on file     Review of Systems   Neurological: Positive for weakness.     Objective:     Vital Signs (Most Recent):  Temp: 97.6 °F (36.4 °C) (08/16/20 0654)  Pulse: 87 (08/16/20 0654)  Resp: (!) 22 (08/16/20 0654)  BP: (!) 150/90 (08/16/20 0654)  SpO2: 96 % (08/16/20 0654) Vital Signs (24h Range):  Temp:  [97.6 °F (36.4 °C)-98.6 °F (37 °C)] 97.6 °F (36.4 °C)  Pulse:  [] 87  Resp:  [14-23] 22  SpO2:  [86 %-96 %] 96 %  BP: ()/(66-93) 150/90     Weight: 57.7 kg (127 lb 1.6 oz)  Body mass index is 18.77 kg/m².    Physical Exam  Constitutional:       Comments: Sedated and intubated   HENT:      Head: Normocephalic and atraumatic.      Nose: Nose normal.      Mouth/Throat:      Mouth: Mucous membranes are dry.   Eyes:      Extraocular Movements: Extraocular movements intact.      Pupils: Pupils are equal, round, and reactive to light.   Neck:      Musculoskeletal: Normal range of motion and neck supple.   Cardiovascular:      Rate and Rhythm: Regular rhythm. Tachycardia present.      Pulses: Normal pulses.   Pulmonary:      Effort: Pulmonary effort is normal.      Breath sounds: Normal breath sounds.   Abdominal:      General: Abdomen is flat. Bowel sounds are normal.   Musculoskeletal: Normal range of motion.   Skin:     General: Skin is warm and dry.   Neurological:      Comments: Difficult exam           CRANIAL NERVES     CN III, IV, VI   Pupils are equal, round, and reactive to light.      Significant Labs:   Recent Lab Results       08/16/20  0845   08/16/20  0238   08/16/20  0115   08/16/20  0040   08/15/20  2314        Albumin         3.5     Alkaline Phosphatase         55     ALT         29     Anion Gap          7     Appearance, UA       Cloudy       AST         77     Bacteria, UA       Occasional       Baso #         0.04     Basophil%         0.7     Bilirubin (UA)       Negative       BILIRUBIN TOTAL         1.1  Comment:  For infants and newborns, interpretation of results should be based  on gestational age, weight and in agreement with clinical  observations.  Premature Infant recommended reference ranges:  Up to 24 hours.............<8.0 mg/dL  Up to 48 hours............<12.0 mg/dL  3-5 days..................<15.0 mg/dL  6-29 days.................<15.0 mg/dL  For patients on Eltrombopag therapy, use of Dimension [place] TBIL is   not   recommended.       BUN, Bld         42     Calcium         9.4     Chloride         104     CO2         27     Color, UA       Yellow       Creatinine         2.3     Differential Method         Automated     eGFR if          27.7     eGFR if non          23.9  Comment:  Calculation used to obtain the estimated glomerular filtration  rate (eGFR) is the CKD-EPI equation.        Eos #         0.1     Eosinophil%         1.3     Glucose         181     Glucose, UA       Negative       Gran # (ANC)         4.1     Gran%         65.9     Hematocrit         38.5     Hemoglobin         12.5     Hyaline Casts, UA       8       Immature Grans (Abs)         0.02  Comment:  Mild elevation in immature granulocytes is non specific and   can be seen in a variety of conditions including stress response,   acute inflammation, trauma and pregnancy. Correlation with other   laboratory and clinical findings is essential.       Immature Granulocytes         0.3     Ketones, UA       Negative       Leukocytes, UA       Trace       Lymph #         1.5     Lymph%         25.0     Magnesium         2.6     MCH         31.7     MCHC         32.5     MCV         98     Microscopic Comment       SEE COMMENT  Comment:  Other formed elements not mentioned in the report are not    present in the microscopic examination.          Mono #         0.4     Mono%         6.8     MPV         9.4     NITRITE UA       Negative       nRBC         0     NT-proBNP         249     Occult Blood UA       2+       pH, UA       5.0       Phosphorus         3.5     Platelets         140     Potassium         3.6     PROTEIN TOTAL         8.7     Protein, UA       1+  Comment:  Recommend a 24 hour urine protein or a urine   protein/creatinine ratio if globulin induced proteinuria is  clinically suspected.         RBC         3.94     RBC, UA       4       RDW         13.1     SARS-CoV-2 RNA, Amplification, Qual   Negative  Comment:  This test utilizes isothermal nucleic acid amplification   technology to detect the SARS-CoV-2 RdRp nucleic acid segment.   The analytical sensitivity (limit of detection) is 125 genome   equivalents/mL.   A POSITIVE result implies infection with the SARS-CoV-2 virus;  the patient is presumed to be contagious.    A NEGATIVE result means that SARS-CoV-2 nucleic acids are not  present above the limit of detection. A NEGATIVE result should be   treated as presumptive. It does not rule out the possibility of   COVID-19 and should not be the sole basis for treatment decisions.   If COVID-19 is strongly suspected based on clinical and exposure   history, re-testing using an alternate molecular assay should be   considered.   This test is only for use under the Food and Drug   Administration s Emergency Use Authorization (EUA).   Commercial kits are provided by [employer].   Performance characteristics of the EUA have been independently  verified by [facility] Department of  Pathology and Laboratory Medicine.   _________________________________________________________________  The ID NOW COVID-19 Letter of Authorization, along with the   authorized Fact Sheet for Healthcare Providers, the authorized Fact  Sheet for Patients, and authorized labeling are available on  the FDA   website:  www.fda.gov/MedicalDevices/Safety/EmergencySituations/qvd675096.htm             Sodium         138     Specific Gravity, UA       1.020       Specimen UA       Urine, Clean Catch       Squam Epithel, UA       1       Troponin I 0.527  Comment:  The reference interval for Troponin I represents the 99th percentile   cutoff   for our facility and is consistent with 3rd generation assay   performance.  ATTENTION: The use of Biotin Supplements may interfere with this   assay.     0.222  Comment:  The reference interval for Troponin I represents the 99th percentile   cutoff   for our facility and is consistent with 3rd generation assay   performance.  ATTENTION: The use of Biotin Supplements may interfere with this   assay.     0.069  Comment:  The reference interval for Troponin I represents the 99th percentile   cutoff   for our facility and is consistent with 3rd generation assay   performance.  ATTENTION: The use of Biotin Supplements may interfere with this   assay.       UROBILINOGEN UA       1.0       WBC, UA       2       WBC         6.15                            Significant Imaging: reviewed    Assessment/Plan:     Active Diagnoses:    Diagnosis Date Noted POA    Syncope [R55] 08/16/2020 Yes    Carotid artery disease [I73.9] 08/16/2020 Yes     Chronic    NSTEMI (non-ST elevated myocardial infarction) [I21.4] 08/16/2020 Yes      Problems Resolved During this Admission:     VTE Risk Mitigation (From admission, onward)         Ordered     enoxaparin injection 30 mg  Every 24 hours      08/16/20 0844     IP VTE HIGH RISK PATIENT  Once      08/16/20 0749     Place sequential compression device  Until discontinued      08/16/20 0749     Place MARIELA hose  Until discontinued      08/16/20 0749              NSTEMI LIKELY DEMAND  ?NEAR SYNCOPE  NICOLE  HTN  HLD  DEMENTIA  H/O CVA   DECONDITIONING  ?UTI POA    PLAN :  IVF  ACS PROTOCOL  LABS IN AM  CARDIO RECCS  RESUME HOME MEDS AS DEEMED  NECCESARY  DECREASE DOSE OF SEROQUEL  GI AND DVT PPX  LOVENOX FOR DVT PPX  ADD IV ABX  ADM TIME 71 MIN  CODE STATUS FULL        Pedro Bird MD  Department of Hospital Medicine   [facility]

## 2020-08-16 NOTE — ED NOTES
NEUROLOGICAL:   Patient is alert , oriented to person and oriented to place. Pupils are PERRL. Gait is unsteady.   Moves all extremities without difficulty.   Patient reports no neuro complaints., weakness and syncope.  GCS 15    HEENT:   Head appears normocephalic  and symmetric .   Eyes appear WNL to both eyes. Patient reports no complaints  to both eyes .   Ears appear WNL. Patient reports no complaints  to both ears.   Nares appear patent . Patient reports no nose complaints .  Mouth appears moist, pink and teeth intact. Patient reports no mouth complaints.   Throat appears pink and moist .    CARDIOVASCULAR:   S1 and S2 present, rate regular  and pulses palpable (2+)    On palpation no edema noted , noted to none.   Patient reports no CV complaints.  .   Patient vitals are WNL.    RESPIRATORY:   Airway Clear, Open, and Patent.  Respirations are even and unlabored.   Breath sounds clear  to all lung fields.   Patient reports no respiratory complaints.     GASTROINTESTINAL:   Abdomen is soft  and non-tender x 4 quadrants. Bowel sounds are normoactive to all quadrants .   Patient reports no GI complaints .     GENITOURINARY:   Patient reports no  complaints.     MUSCULOSKELETAL:   full range of motion to all extremities, no swelling noted  and no tenderness noted.   Patient reports no musculoskeletal complaints     SKIN:   Skin appears warm , dry , good turgor, color normal for race and intact. Patient reports no skin complaints.     Pt was at home when family tried to help her to bed when he became weak and had a syncopal episode.

## 2020-08-17 ENCOUNTER — CLINICAL SUPPORT (OUTPATIENT)
Dept: CARDIOLOGY | Facility: HOSPITAL | Age: 85
DRG: 281 | End: 2020-08-17
Attending: INTERNAL MEDICINE
Payer: MEDICARE

## 2020-08-17 VITALS
DIASTOLIC BLOOD PRESSURE: 70 MMHG | SYSTOLIC BLOOD PRESSURE: 117 MMHG | BODY MASS INDEX: 18.66 KG/M2 | WEIGHT: 126 LBS | HEIGHT: 69 IN

## 2020-08-17 PROBLEM — N17.9 AKI (ACUTE KIDNEY INJURY): Status: ACTIVE | Noted: 2020-08-17

## 2020-08-17 PROBLEM — R53.1 WEAKNESS: Status: ACTIVE | Noted: 2020-08-17

## 2020-08-17 LAB
ALBUMIN SERPL BCP-MCNC: 3 G/DL (ref 3.5–5.2)
ALP SERPL-CCNC: 44 U/L (ref 55–135)
ALT SERPL W/O P-5'-P-CCNC: 27 U/L (ref 10–44)
ANION GAP SERPL CALC-SCNC: 7 MMOL/L (ref 8–16)
AORTIC ROOT ANNULUS: 4.32 CM
AORTIC VALVE CUSP SEPERATION: 1.76 CM
AST SERPL-CCNC: 71 U/L (ref 10–40)
AV INDEX (PROSTH): 0.72
AV MEAN GRADIENT: 3 MMHG
AV PEAK GRADIENT: 5 MMHG
AV VALVE AREA: 2.6 CM2
AV VELOCITY RATIO: 0.62
BASOPHILS # BLD AUTO: 0.05 K/UL (ref 0–0.2)
BASOPHILS NFR BLD: 0.7 % (ref 0–1.9)
BILIRUB SERPL-MCNC: 1.2 MG/DL (ref 0.1–1)
BSA FOR ECHO PROCEDURE: 1.67 M2
BUN SERPL-MCNC: 26 MG/DL (ref 10–30)
CALCIUM SERPL-MCNC: 8.3 MG/DL (ref 8.7–10.5)
CHLORIDE SERPL-SCNC: 108 MMOL/L (ref 95–110)
CO2 SERPL-SCNC: 26 MMOL/L (ref 23–29)
CREAT SERPL-MCNC: 1.3 MG/DL (ref 0.5–1.4)
CV ECHO LV RWT: 0.7 CM
DIFFERENTIAL METHOD: ABNORMAL
DOP CALC AO PEAK VEL: 1.11 M/S
DOP CALC AO VTI: 20.87 CM
DOP CALC LVOT AREA: 3.6 CM2
DOP CALC LVOT DIAMETER: 2.15 CM
DOP CALC LVOT PEAK VEL: 0.69 M/S
DOP CALC LVOT STROKE VOLUME: 54.28 CM3
DOP CALCLVOT PEAK VEL VTI: 14.96 CM
E WAVE DECELERATION TIME: 244.71 MSEC
E/A RATIO: 0.61
ECHO LV POSTERIOR WALL: 1.3 CM (ref 0.6–1.1)
EOSINOPHIL # BLD AUTO: 0.2 K/UL (ref 0–0.5)
EOSINOPHIL NFR BLD: 2.4 % (ref 0–8)
ERYTHROCYTE [DISTWIDTH] IN BLOOD BY AUTOMATED COUNT: 12.9 % (ref 11.5–14.5)
EST. GFR  (AFRICAN AMERICAN): 55.1 ML/MIN/1.73 M^2
EST. GFR  (NON AFRICAN AMERICAN): 47.7 ML/MIN/1.73 M^2
FRACTIONAL SHORTENING: 29 % (ref 28–44)
GLUCOSE SERPL-MCNC: 62 MG/DL (ref 70–110)
HCT VFR BLD AUTO: 37.2 % (ref 40–54)
HGB BLD-MCNC: 12.2 G/DL (ref 14–18)
IMM GRANULOCYTES # BLD AUTO: 0.02 K/UL (ref 0–0.04)
IMM GRANULOCYTES NFR BLD AUTO: 0.3 % (ref 0–0.5)
INTERVENTRICULAR SEPTUM: 1.68 CM (ref 0.6–1.1)
LEFT ATRIUM SIZE: 2.29 CM
LEFT INTERNAL DIMENSION IN SYSTOLE: 2.63 CM (ref 2.1–4)
LEFT VENTRICLE DIASTOLIC VOLUME INDEX: 34.46 ML/M2
LEFT VENTRICLE DIASTOLIC VOLUME: 58.49 ML
LEFT VENTRICLE MASS INDEX: 122 G/M2
LEFT VENTRICLE SYSTOLIC VOLUME INDEX: 14.9 ML/M2
LEFT VENTRICLE SYSTOLIC VOLUME: 25.37 ML
LEFT VENTRICULAR INTERNAL DIMENSION IN DIASTOLE: 3.71 CM (ref 3.5–6)
LEFT VENTRICULAR MASS: 207.23 G
LYMPHOCYTES # BLD AUTO: 1.6 K/UL (ref 1–4.8)
LYMPHOCYTES NFR BLD: 21.4 % (ref 18–48)
MCH RBC QN AUTO: 31.8 PG (ref 27–31)
MCHC RBC AUTO-ENTMCNC: 32.8 G/DL (ref 32–36)
MCV RBC AUTO: 97 FL (ref 82–98)
MONOCYTES # BLD AUTO: 0.6 K/UL (ref 0.3–1)
MONOCYTES NFR BLD: 8.1 % (ref 4–15)
MV PEAK A VEL: 0.66 M/S
MV PEAK E VEL: 0.4 M/S
MV STENOSIS PRESSURE HALF TIME: 70.97 MS
MV VALVE AREA P 1/2 METHOD: 3.1 CM2
NEUTROPHILS # BLD AUTO: 5 K/UL (ref 1.8–7.7)
NEUTROPHILS NFR BLD: 67.1 % (ref 38–73)
NRBC BLD-RTO: 0 /100 WBC
PISA MRMAX VEL: 0.03 M/S
PISA TR MAX VEL: 2.57 M/S
PLATELET # BLD AUTO: 154 K/UL (ref 150–350)
PMV BLD AUTO: 9.9 FL (ref 9.2–12.9)
POCT GLUCOSE: 136 MG/DL (ref 70–110)
POCT GLUCOSE: 80 MG/DL (ref 70–110)
POTASSIUM SERPL-SCNC: 3.8 MMOL/L (ref 3.5–5.1)
PROT SERPL-MCNC: 7.7 G/DL (ref 6–8.4)
PV PEAK VELOCITY: 0.37 CM/S
RA PRESSURE: 3 MMHG
RBC # BLD AUTO: 3.84 M/UL (ref 4.6–6.2)
RIGHT VENTRICULAR END-DIASTOLIC DIMENSION: 3.54 CM
SODIUM SERPL-SCNC: 141 MMOL/L (ref 136–145)
TR MAX PG: 26 MMHG
TV REST PULMONARY ARTERY PRESSURE: 29 MMHG
WBC # BLD AUTO: 7.4 K/UL (ref 3.9–12.7)

## 2020-08-17 PROCEDURE — 99900035 HC TECH TIME PER 15 MIN (STAT)

## 2020-08-17 PROCEDURE — 36415 COLL VENOUS BLD VENIPUNCTURE: CPT

## 2020-08-17 PROCEDURE — G0378 HOSPITAL OBSERVATION PER HR: HCPCS

## 2020-08-17 PROCEDURE — 96361 HYDRATE IV INFUSION ADD-ON: CPT | Performed by: EMERGENCY MEDICINE

## 2020-08-17 PROCEDURE — 11000001 HC ACUTE MED/SURG PRIVATE ROOM

## 2020-08-17 PROCEDURE — 80053 COMPREHEN METABOLIC PANEL: CPT

## 2020-08-17 PROCEDURE — 93306 TTE W/DOPPLER COMPLETE: CPT

## 2020-08-17 PROCEDURE — 25000003 PHARM REV CODE 250: Performed by: INTERNAL MEDICINE

## 2020-08-17 PROCEDURE — 85025 COMPLETE CBC W/AUTO DIFF WBC: CPT

## 2020-08-17 PROCEDURE — 63600175 PHARM REV CODE 636 W HCPCS: Performed by: INTERNAL MEDICINE

## 2020-08-17 PROCEDURE — 27000221 HC OXYGEN, UP TO 24 HOURS

## 2020-08-17 PROCEDURE — 94761 N-INVAS EAR/PLS OXIMETRY MLT: CPT

## 2020-08-17 PROCEDURE — 96365 THER/PROPH/DIAG IV INF INIT: CPT | Performed by: EMERGENCY MEDICINE

## 2020-08-17 RX ORDER — DEXTROSE MONOHYDRATE 100 MG/ML
INJECTION, SOLUTION INTRAVENOUS CONTINUOUS
Status: DISCONTINUED | OUTPATIENT
Start: 2020-08-17 | End: 2020-08-19

## 2020-08-17 RX ADMIN — DEXTROSE: 10 SOLUTION INTRAVENOUS at 10:08

## 2020-08-17 RX ADMIN — CLOPIDOGREL 75 MG: 75 TABLET, FILM COATED ORAL at 08:08

## 2020-08-17 RX ADMIN — ENOXAPARIN SODIUM 30 MG: 30 INJECTION SUBCUTANEOUS at 04:08

## 2020-08-17 RX ADMIN — QUETIAPINE FUMARATE 100 MG: 100 TABLET ORAL at 08:08

## 2020-08-17 RX ADMIN — DEXTROSE: 10 SOLUTION INTRAVENOUS at 03:08

## 2020-08-17 RX ADMIN — METOPROLOL TARTRATE 25 MG: 25 TABLET, FILM COATED ORAL at 08:08

## 2020-08-17 RX ADMIN — CEFTRIAXONE SODIUM 1 G: 1 INJECTION, POWDER, FOR SOLUTION INTRAMUSCULAR; INTRAVENOUS at 11:08

## 2020-08-17 RX ADMIN — DEXTROSE: 10 SOLUTION INTRAVENOUS at 07:08

## 2020-08-17 RX ADMIN — ASPIRIN 81 MG: 81 TABLET, COATED ORAL at 08:08

## 2020-08-17 RX ADMIN — ATORVASTATIN CALCIUM 40 MG: 40 TABLET, FILM COATED ORAL at 08:08

## 2020-08-17 NOTE — SUBJECTIVE & OBJECTIVE
Past Medical History:   Diagnosis Date    Dementia     Diastolic dysfunction without heart failure 2014    History of ischemic stroke without residual deficits 2014    s/p tPA therapy    Hyperlipidemia     Hypertension        Past Surgical History:   Procedure Laterality Date    APPENDECTOMY      HERNIA REPAIR         Review of patient's allergies indicates:  No Known Allergies    No current facility-administered medications on file prior to encounter.      Current Outpatient Medications on File Prior to Encounter   Medication Sig    amLODIPine (NORVASC) 10 MG tablet Take 1 tablet by mouth once daily.    QUEtiapine (SEROQUEL) 100 MG Tab Take 1 tablet by mouth once daily.    QUEtiapine (SEROQUEL) 200 MG Tab Take 1 tablet by mouth every evening.    aspirin (ECOTRIN) 81 MG EC tablet Take 1 tablet (81 mg total) by mouth once daily.    atorvastatin (LIPITOR) 40 MG tablet Take 1 tablet (40 mg total) by mouth once daily.     Family History     None        Tobacco Use    Smoking status: Never Smoker    Smokeless tobacco: Never Used   Substance and Sexual Activity    Alcohol use: No    Drug use: No    Sexual activity: Not on file     Review of Systems   Constitutional: Negative for chills and fever.   HENT: Negative for rhinorrhea, sneezing and sore throat.    Eyes: Negative for pain and visual disturbance.   Respiratory: Negative for cough and shortness of breath.    Cardiovascular: Negative for chest pain.   Gastrointestinal: Negative for abdominal pain, constipation and diarrhea.   Endocrine: Negative for cold intolerance and heat intolerance.   Genitourinary: Negative for difficulty urinating.   Musculoskeletal: Negative for arthralgias and joint swelling.   Skin: Negative for rash.   Allergic/Immunologic: Negative for environmental allergies.   Neurological: Negative for dizziness, syncope and weakness.   Hematological: Does not bruise/bleed easily.   Psychiatric/Behavioral: Positive for confusion.  Negative for dysphoric mood. The patient is not nervous/anxious.      Objective:     Vital Signs (Most Recent):  Temp: 98.2 °F (36.8 °C) (08/17/20 1102)  Pulse: 65 (08/17/20 1102)  Resp: 20 (08/17/20 1102)  BP: 117/70 (08/17/20 1102)  SpO2: 96 % (08/17/20 1102) Vital Signs (24h Range):  Temp:  [97.7 °F (36.5 °C)-98.2 °F (36.8 °C)] 98.2 °F (36.8 °C)  Pulse:  [65-97] 65  Resp:  [20-22] 20  SpO2:  [92 %-100 %] 96 %  BP: (117-153)/() 117/70     Weight: 57.7 kg (127 lb 1.6 oz)  Body mass index is 18.77 kg/m².    Physical Exam  Vitals signs and nursing note reviewed.   Constitutional:       Appearance: Normal appearance.      Comments: Thin, chronically ill appearing.   HENT:      Head: Normocephalic and atraumatic.      Nose: Nose normal.   Eyes:      Extraocular Movements: Extraocular movements intact.      Pupils: Pupils are equal, round, and reactive to light.   Neck:      Musculoskeletal: Normal range of motion and neck supple.   Cardiovascular:      Rate and Rhythm: Normal rate and regular rhythm.      Heart sounds: No murmur. No friction rub. No gallop.    Pulmonary:      Effort: Pulmonary effort is normal.      Breath sounds: Normal breath sounds.   Abdominal:      General: Abdomen is flat. Bowel sounds are normal.      Palpations: Abdomen is soft.   Musculoskeletal:         General: No swelling or deformity.   Skin:     General: Skin is warm and dry.      Capillary Refill: Capillary refill takes less than 2 seconds.   Neurological:      General: No focal deficit present.      Mental Status: He is alert and oriented to person, place, and time.   Psychiatric:      Comments: Dementia noted           CRANIAL NERVES     CN III, IV, VI   Pupils are equal, round, and reactive to light.       Lab Results   Component Value Date    WBC 7.40 08/17/2020    RBC 3.84 (L) 08/17/2020    HGB 12.2 (L) 08/17/2020    HCT 37.2 (L) 08/17/2020    MCV 97 08/17/2020    MCH 31.8 (H) 08/17/2020    MCHC 32.8 08/17/2020    RDW 12.9  08/17/2020     08/17/2020    MPV 9.9 08/17/2020    GRAN 5.0 08/17/2020    GRAN 67.1 08/17/2020    LYMPH 1.6 08/17/2020    LYMPH 21.4 08/17/2020    MONO 0.6 08/17/2020    MONO 8.1 08/17/2020    EOS 0.2 08/17/2020    BASO 0.05 08/17/2020    EOSINOPHIL 2.4 08/17/2020    BASOPHIL 0.7 08/17/2020    MG 2.6 08/15/2020        CMP  Lab Results   Component Value Date     08/17/2020    K 3.8 08/17/2020     08/17/2020    CO2 26 08/17/2020    GLU 62 (L) 08/17/2020    BUN 26 08/17/2020    CREATININE 1.3 08/17/2020    CALCIUM 8.3 (L) 08/17/2020    PROT 7.7 08/17/2020    ALBUMIN 3.0 (L) 08/17/2020    BILITOT 1.2 (H) 08/17/2020    ALKPHOS 44 (L) 08/17/2020    AST 71 (H) 08/17/2020    ALT 27 08/17/2020    ANIONGAP 7 (L) 08/17/2020    ESTGFRAFRICA 55.1 (A) 08/17/2020    EGFRNONAA 47.7 (A) 08/17/2020        Lab Results   Component Value Date    LABBLOO No growth after 5 days. 11/24/2017    LABURIN No growth 11/24/2017        Imaging Results          CT Head Without Contrast (Final result)  Result time 08/16/20 10:10:12    Final result by Jaycee Mosley MD (08/16/20 10:10:12)                 Impression:      No acute intracranial abnormalities.  No significant change since November 2017    Severe chronic white matter disease, most likely from small vessel ischemic changes      Electronically signed by: Jaycee Mosley MD  Date:    08/16/2020  Time:    10:10             Narrative:    EXAMINATION:  CT HEAD WITHOUT CONTRAST    CLINICAL HISTORY:  Altered mental status;    CT/Cardiac Nuclear exams in prior 12 months: 0    TECHNIQUE:  Axial head CT performed without IV contrast.  Iterative reconstruction utilized.    COMPARISON:  CT head 11/24/2017    FINDINGS:  No intracranial hemorrhage, acute infarction or mass effect detected.    Diffuse confluent decreased white matter attenuation, supra and periventricular regions.  Mild cerebral atrophy, not advanced for age                               X-Ray Chest AP Portable  (Final result)  Result time 08/16/20 11:30:20    Final result by Jaycee Mosley MD (08/16/20 11:30:20)                 Impression:      No acute findings      Electronically signed by: Jaycee Mosley MD  Date:    08/16/2020  Time:    11:30             Narrative:    EXAMINATION:  XR CHEST AP PORTABLE    CLINICAL HISTORY:  Chest Pain;    COMPARISON:  11/24/2017    FINDINGS:  No acute infiltrates.  Normal size cardiac silhouette.  No vascular congestion or pleural fluid                                  Significant Labs: All pertinent labs within the past 24 hours have been reviewed.    Significant Imaging: I have reviewed and interpreted all pertinent imaging results/findings within the past 24 hours.

## 2020-08-17 NOTE — ASSESSMENT & PLAN NOTE
BMP reviewed- noted Estimated Creatinine Clearance: 30.2 mL/min (based on SCr of 1.3 mg/dL). according to latest data. Monitor UOP and serial BMP and adjust therapy as needed. Renally dose meds.    BMP  Lab Results   Component Value Date     08/17/2020    K 3.8 08/17/2020     08/17/2020    CO2 26 08/17/2020    BUN 26 08/17/2020    CREATININE 1.3 08/17/2020    CALCIUM 8.3 (L) 08/17/2020    ANIONGAP 7 (L) 08/17/2020    ESTGFRAFRICA 55.1 (A) 08/17/2020    EGFRNONAA 47.7 (A) 08/17/2020       Lab Results   Component Value Date    CREATININE 1.3 08/17/2020    CREATININE 2.3 (H) 08/15/2020    CREATININE 1.7 (H) 07/29/2020      Continue gentle volume resuscitation.  Patient appears to be nearing his

## 2020-08-17 NOTE — PROGRESS NOTES
"[facility] Medicine  Progress Note    Patient Name: Guido Michaels  MRN: [id number]  Patient Class: IP- Inpatient   Admission Date: 8/15/2020  Length of Stay: 0 days  Attending Physician: Michael Hsu Jr., MD  Primary Care Provider: Vinicio Walter MD        Subjective:     Principal Problem:Syncope        HPI:  Chief Complaint   Patient presents with    Loss of Consciousness       Family reports they were assisting the patient to bed when he "went limp". Family reprots pt is back to baseline. They report he has hx of stroke and dementia. Pt denies pain/complaints at this time. EMS reports pt was hypotensive in route.      Pt is a 89 y/o M who is a poor historian secondary to dementia who presents with concern for a brief episode of unresponsiveness at home.  EMS reported that the family called as he was weak and went limp for several seconds as the family was attempting to put him in bed. Pt was back to baseline mental status upon EMS arrival according to the family.        Overview/Hospital Course:  No notes on file    No new subjective & objective note has been filed under this hospital service since the last note was generated.      Assessment/Plan:      * Syncope  No more recurrences continue telemetry monitoring.      Weakness  Continue physical and occupational therapy.        Rx:  semi-electric hospital bed semi electric hospital bed    The patient has a medical condition which requires positioning of the body in ways not feasible with an ordinary bed.      The patient requires positioning of the body is in ways that are not feasible with an ordinary bed in order to alleviate pain.            NICOLE (acute kidney injury)  BMP reviewed- noted Estimated Creatinine Clearance: 30.2 mL/min (based on SCr of 1.3 mg/dL). according to latest data. Monitor UOP and serial BMP and adjust therapy as needed. Renally dose meds.    BMP  Lab Results   Component Value Date     08/17/2020    K 3.8 " 08/17/2020     08/17/2020    CO2 26 08/17/2020    BUN 26 08/17/2020    CREATININE 1.3 08/17/2020    CALCIUM 8.3 (L) 08/17/2020    ANIONGAP 7 (L) 08/17/2020    ESTGFRAFRICA 55.1 (A) 08/17/2020    EGFRNONAA 47.7 (A) 08/17/2020       Lab Results   Component Value Date    CREATININE 1.3 08/17/2020    CREATININE 2.3 (H) 08/15/2020    CREATININE 1.7 (H) 07/29/2020      Continue gentle volume resuscitation.  Patient appears to be nearing his      NSTEMI (non-ST elevated myocardial infarction)  The patient resents with an acute coronary syndrome:  NSTEMI.  We will pursue treatment per ACS protocol.    Recent Labs   Lab 08/15/20  2314 08/16/20  0115 08/16/20  0845   TROPONINI 0.069* 0.222* 0.527*        Results for orders placed or performed during the hospital encounter of 08/15/20   EKG 12-lead    Collection Time: 08/16/20 10:43 AM    Narrative    Test Reason : I21.4,    Vent. Rate : 068 BPM     Atrial Rate : 068 BPM     P-R Int : 276 ms          QRS Dur : 082 ms      QT Int : 438 ms       P-R-T Axes : 062 -55 076 degrees     QTc Int : 465 ms    Sinus rhythm with 1st degree A-V block  Left anterior fascicular block  Cannot exclude prior  Septal infarct (cited on or before 24-NOV-2017)  Abnormal ECG  When compared with ECG of 15-AUG-2020 23:08,  Premature atrial complexes are no longer Present  Vent. rate has decreased BY  43 BPM  Confirmed by Shorty ARIAS MD ([id number]) on 8/16/2020 1:22:16 PM    Referred By: [email or web address]   SELF           Confirmed By:Shorty ARIAS MD      Will consult cardiology for further eval        Essential hypertension  Blood pressure appears mildly elevated.  Will continue to closely monitor especially in the setting of ACS.      Hypovolemia  Resolving nicely with volume resuscitation        VTE Risk Mitigation (From admission, onward)         Ordered     enoxaparin injection 30 mg  Every 24 hours      08/16/20 0844     IP VTE HIGH RISK PATIENT  Once      08/16/20 0749     Place sequential compression  device  Until discontinued      08/16/20 0749     Place MARIELA hose  Until discontinued      08/16/20 0749                Discharge Planning   PRUDENCIO:      Code Status: Full Code   Is the patient medically ready for discharge?:     Reason for patient still in hospital (select all that apply): Patient unstable  Discharge Plan A: Home Health                  Michael Hsu Jr, MD  Department of Hospital Medicine   [facility]

## 2020-08-17 NOTE — PLAN OF CARE
Pt is easily aroused. He is agitated when care is being provided to him. No signs of pain or distress noted. Restraints were removed during meals, then reapplied due to him pulling at IV site and NC tubing. No signs of pain or distress noted.

## 2020-08-17 NOTE — ASSESSMENT & PLAN NOTE
The patient resents with an acute coronary syndrome:  NSTEMI.  We will pursue treatment per ACS protocol.    Recent Labs   Lab 08/15/20  2314 08/16/20  0115 08/16/20  0845   TROPONINI 0.069* 0.222* 0.527*        Results for orders placed or performed during the hospital encounter of 08/15/20   EKG 12-lead    Collection Time: 08/16/20 10:43 AM    Narrative    Test Reason : I21.4,    Vent. Rate : 068 BPM     Atrial Rate : 068 BPM     P-R Int : 276 ms          QRS Dur : 082 ms      QT Int : 438 ms       P-R-T Axes : 062 -55 076 degrees     QTc Int : 465 ms    Sinus rhythm with 1st degree A-V block  Left anterior fascicular block  Cannot exclude prior  Septal infarct (cited on or before 24-NOV-2017)  Abnormal ECG  When compared with ECG of 15-AUG-2020 23:08,  Premature atrial complexes are no longer Present  Vent. rate has decreased BY  43 BPM  Confirmed by Shorty ARIAS MD ([id number]) on 8/16/2020 1:22:16 PM    Referred By: [email or web address]   SELF           Confirmed By:Shorty ARIAS MD      Will consult cardiology for further eval

## 2020-08-17 NOTE — ASSESSMENT & PLAN NOTE
Blood pressure appears mildly elevated.  Will continue to closely monitor especially in the setting of ACS.

## 2020-08-17 NOTE — HPI
"Chief Complaint   Patient presents with    Loss of Consciousness       Family reports they were assisting the patient to bed when he "went limp". Family reprots pt is back to baseline. They report he has hx of stroke and dementia. Pt denies pain/complaints at this time. EMS reports pt was hypotensive in route.      Pt is a 89 y/o M who is a poor historian secondary to dementia who presents with concern for a brief episode of unresponsiveness at home.  EMS reported that the family called as he was weak and went limp for several seconds as the family was attempting to put him in bed. Pt was back to baseline mental status upon EMS arrival according to the family.      "

## 2020-08-17 NOTE — PLAN OF CARE
08/17/20 1317   Discharge Assessment   Confirmed/corrected address and phone number on facesheet? Yes   Assessment information obtained from? Caregiver   Expected Length of Stay (days) 2   Communicated expected length of stay with patient/caregiver yes   Prior to hospitilization cognitive status: Not Oriented to Place;Not Oriented to Time   Prior to hospitalization functional status: Needs Assistance   Current cognitive status: Unable to Assess   Current Functional Status: Needs Assistance   Lives With spouse   Able to Return to Prior Arrangements yes   Is patient able to care for self after discharge? Yes   Who are your caregiver(s) and their phone number(s)? Angela Michaels, [phone]   Patient's perception of discharge disposition home health  (Nursing Care)   Readmission Within the Last 30 Days no previous admission in last 30 days   Patient currently being followed by outpatient case management? No   Patient currently receives any other outside agency services? No  (Patient was to be admitted to [facility] home health but was admitted to hospital prior to admission.)   Is it the patient/care giver preference to resume care with the current outside agency? No   Equipment Currently Used at Home wheelchair;walker, rolling;bedside commode;bath bench   Do you have any problems affording any of your prescribed medications? No   Is the patient taking medications as prescribed? yes   Does the patient have transportation home? Yes   Transportation Anticipated family or friend will provide   Does the patient receive services at the [facility]? No   Discharge Plan A Home Health   Discharge Plan B Home Health   DME Needed Upon Discharge  none   Patient/Family in Agreement with Plan yes     Patient requires assistance with bathing and dressing.

## 2020-08-17 NOTE — PLAN OF CARE
Received call from patient's spouse requesting CM to assist her with obtaining hospital bed.  CM informed her that patient needs to meet criteria for qualifying for hospital bed, but will speak to physician to see if he believes patient would benefit for it.  Patient is currently ambulatory.  Explained to Ms. Michaels that we will move forward with obtaining hospital bed if physician orders it and insurances approves it.

## 2020-08-17 NOTE — PLAN OF CARE
08/17/20 1535   Discharge Assessment   DME Needed Upon Discharge  hospital bed   Patient/Family in Agreement with Plan yes     Referral sent to [facility].

## 2020-08-17 NOTE — PLAN OF CARE
Pt will have decrease episodes of agitation and anxiety. Pt will be well hydrated. Pt voices no c/o chest pain. Pt will not try to pull out IV and remove telemetry and will have restraints discontinued.

## 2020-08-17 NOTE — ASSESSMENT & PLAN NOTE
Continue physical and occupational therapy.        Rx:  semi-electric hospital bed semi electric hospital bed    The patient has a medical condition which requires positioning of the body in ways not feasible with an ordinary bed.      The patient requires positioning of the body is in ways that are not feasible with an ordinary bed in order to alleviate pain.

## 2020-08-17 NOTE — PROGRESS NOTES
Cardiology Progress Note  [facility]    Today's Date:  8/17/2020  Patient Name: Guido Michaels    MRN: [id number]    Code Status: Full Code     Attending Physician: Michael Hsu Jr., MD   Consulting Physician: NILSA Camilo MD    Hospital Course:   The patient remains confused.  Is however hemodynamically stable.  Blood pressure today is 132/105.  He was started on metoprolol 25 mg 1 tablet b.i.d..  He was noted to be significantly volume contracted yesterday for which he is on IV fluids at 125 cc/hour.  He appears to be tolerating fluids well.    Notes from Internal Medicine, note that the patient was confused for which he was given with the Geodon.    Review of Systems   Cardiovascular: Negative for chest pain, dyspnea on exertion, leg swelling and orthopnea.   Respiratory: Negative for cough and shortness of breath.    Skin: Positive for dry skin.   Neurological: Positive for difficulty with concentration and focal weakness.   Psychiatric/Behavioral: Positive for altered mental status.   All other systems reviewed and are negative.        Vital Signs (Most Recent):  Temp: 97.7 °F (36.5 °C) (08/17/20 0730)  Pulse: 66 (08/17/20 0730)  Resp: 20 (08/17/20 0730)  BP: (!) 132/105 (08/17/20 0730)  SpO2: (!) 92 % (08/17/20 0730) Vital Signs (24h Range):  Temp:  [97.7 °F (36.5 °C)-98.1 °F (36.7 °C)] 97.7 °F (36.5 °C)  Pulse:  [66-97] 66  Resp:  [20-22] 20  SpO2:  [92 %-100 %] 92 %  BP: (115-153)/() 132/105     Weight: 57.7 kg (127 lb 1.6 oz)  Body mass index is 18.77 kg/m².    SpO2: (!) 92 %  O2 Device (Oxygen Therapy): nasal cannula      Intake/Output Summary (Last 24 hours) at 8/17/2020 0904  Last data filed at 8/17/2020 0600  Gross per 24 hour   Intake 2207 ml   Output --   Net 2207 ml       Physical Exam  General:  Confused and disoriented.  No distress.  Heent:  No JVD.  Lungs:  Clear to auscultation.  Patient with poor inspiratory effort.  Heart:  Regular rate rhythm.  Extremities:  Without  edema.    Labs:   CMP   Recent Labs   Lab 08/15/20  2314 08/17/20  0341    141   K 3.6 3.8    108   CO2 27 26   * 62*   BUN 42* 26   CREATININE 2.3* 1.3   CALCIUM 9.4 8.3*   PROT 8.7* 7.7   ALBUMIN 3.5 3.0*   BILITOT 1.1* 1.2*   ALKPHOS 55 44*   AST 77* 71*   ALT 29 27   ANIONGAP 7* 7*   ESTGFRAFRICA 27.7* 55.1*   EGFRNONAA 23.9* 47.7*    and CBC   Recent Labs   Lab 08/15/20  2314 08/17/20  0341   WBC 6.15 7.40   HGB 12.5* 12.2*   HCT 38.5* 37.2*   * 154    Will continue to monitor.      Assessment and Plan:   1.  Non ST segment elevated MI:  We will continue with conservative management given his chronic comorbid state.  He is clinically stable.  Moreover, he is not complaining of chest pain.  · Continue with current therapy which includes dual anti-platelet therapy, and metoprolol.  · If the patient's blood pressure should start to increase, then I would institute ARB therapy.  Yesterday his creatinine was elevated, it has normalized.  2.  Acute renal failure:  Secondary to pre renal azotemia.  Fluids are being infused at 125 cc/hour.  He remains volume contracted, therefore we will continue with current therapy.  · Echocardiogram pending.  3.  Hypertension:  Will continue to monitor.  · As previously noted, will institute ARB therapy.  4.  Hyperlipidemia:  Will check a fasting lipid profile.  He is on statin therapy.  5.  Peripheral arterial disease:  Carotid duplex scan is pending today.  6.  Dementia:  Patient's CT scan has shown chronic vas based on this, his dementia is likely multi infarct.  Moreover, he has a history of ischemic strokes.  cular changes.

## 2020-08-17 NOTE — PROGRESS NOTES
Reported severe agitation with confusion and pulling lines  Added geodon low dose im x1  Close monitoring  Soft Restraints if needed

## 2020-08-18 LAB
ALBUMIN SERPL BCP-MCNC: 2.9 G/DL (ref 3.5–5.2)
ALP SERPL-CCNC: 48 U/L (ref 55–135)
ALT SERPL W/O P-5'-P-CCNC: 30 U/L (ref 10–44)
ANION GAP SERPL CALC-SCNC: 5 MMOL/L (ref 8–16)
AST SERPL-CCNC: 59 U/L (ref 10–40)
BASOPHILS # BLD AUTO: 0.02 K/UL (ref 0–0.2)
BASOPHILS NFR BLD: 0.3 % (ref 0–1.9)
BILIRUB SERPL-MCNC: 0.9 MG/DL (ref 0.1–1)
BUN SERPL-MCNC: 26 MG/DL (ref 10–30)
CALCIUM SERPL-MCNC: 8.6 MG/DL (ref 8.7–10.5)
CHLORIDE SERPL-SCNC: 109 MMOL/L (ref 95–110)
CO2 SERPL-SCNC: 26 MMOL/L (ref 23–29)
CREAT SERPL-MCNC: 1.4 MG/DL (ref 0.5–1.4)
DIFFERENTIAL METHOD: ABNORMAL
EOSINOPHIL # BLD AUTO: 0.2 K/UL (ref 0–0.5)
EOSINOPHIL NFR BLD: 2.8 % (ref 0–8)
ERYTHROCYTE [DISTWIDTH] IN BLOOD BY AUTOMATED COUNT: 13 % (ref 11.5–14.5)
EST. GFR  (AFRICAN AMERICAN): 50.4 ML/MIN/1.73 M^2
EST. GFR  (NON AFRICAN AMERICAN): 43.6 ML/MIN/1.73 M^2
GLUCOSE SERPL-MCNC: 116 MG/DL (ref 70–110)
HCT VFR BLD AUTO: 39 % (ref 40–54)
HGB BLD-MCNC: 12.8 G/DL (ref 14–18)
IMM GRANULOCYTES # BLD AUTO: 0.02 K/UL (ref 0–0.04)
IMM GRANULOCYTES NFR BLD AUTO: 0.3 % (ref 0–0.5)
LYMPHOCYTES # BLD AUTO: 1.4 K/UL (ref 1–4.8)
LYMPHOCYTES NFR BLD: 21.2 % (ref 18–48)
MAGNESIUM SERPL-MCNC: 2.2 MG/DL (ref 1.6–2.6)
MCH RBC QN AUTO: 31.6 PG (ref 27–31)
MCHC RBC AUTO-ENTMCNC: 32.8 G/DL (ref 32–36)
MCV RBC AUTO: 96 FL (ref 82–98)
MONOCYTES # BLD AUTO: 0.5 K/UL (ref 0.3–1)
MONOCYTES NFR BLD: 6.9 % (ref 4–15)
NEUTROPHILS # BLD AUTO: 4.5 K/UL (ref 1.8–7.7)
NEUTROPHILS NFR BLD: 68.5 % (ref 38–73)
NRBC BLD-RTO: 0 /100 WBC
PLATELET # BLD AUTO: 148 K/UL (ref 150–350)
PMV BLD AUTO: 9.9 FL (ref 9.2–12.9)
POCT GLUCOSE: 106 MG/DL (ref 70–110)
POCT GLUCOSE: 110 MG/DL (ref 70–110)
POCT GLUCOSE: 140 MG/DL (ref 70–110)
POCT GLUCOSE: 80 MG/DL (ref 70–110)
POTASSIUM SERPL-SCNC: 4.3 MMOL/L (ref 3.5–5.1)
PROT SERPL-MCNC: 7.4 G/DL (ref 6–8.4)
RBC # BLD AUTO: 4.05 M/UL (ref 4.6–6.2)
SODIUM SERPL-SCNC: 140 MMOL/L (ref 136–145)
WBC # BLD AUTO: 6.5 K/UL (ref 3.9–12.7)

## 2020-08-18 PROCEDURE — 11000001 HC ACUTE MED/SURG PRIVATE ROOM

## 2020-08-18 PROCEDURE — 80053 COMPREHEN METABOLIC PANEL: CPT

## 2020-08-18 PROCEDURE — 25000003 PHARM REV CODE 250: Performed by: INTERNAL MEDICINE

## 2020-08-18 PROCEDURE — 63600175 PHARM REV CODE 636 W HCPCS: Performed by: INTERNAL MEDICINE

## 2020-08-18 PROCEDURE — 36415 COLL VENOUS BLD VENIPUNCTURE: CPT

## 2020-08-18 PROCEDURE — 99900035 HC TECH TIME PER 15 MIN (STAT)

## 2020-08-18 PROCEDURE — 83735 ASSAY OF MAGNESIUM: CPT

## 2020-08-18 PROCEDURE — 85025 COMPLETE CBC W/AUTO DIFF WBC: CPT

## 2020-08-18 PROCEDURE — 27000221 HC OXYGEN, UP TO 24 HOURS

## 2020-08-18 PROCEDURE — 94761 N-INVAS EAR/PLS OXIMETRY MLT: CPT

## 2020-08-18 PROCEDURE — 97161 PT EVAL LOW COMPLEX 20 MIN: CPT

## 2020-08-18 PROCEDURE — 97165 OT EVAL LOW COMPLEX 30 MIN: CPT

## 2020-08-18 RX ORDER — METOPROLOL SUCCINATE 50 MG/1
50 TABLET, EXTENDED RELEASE ORAL DAILY
Status: DISCONTINUED | OUTPATIENT
Start: 2020-08-19 | End: 2020-08-19 | Stop reason: HOSPADM

## 2020-08-18 RX ORDER — AMLODIPINE BESYLATE 5 MG/1
5 TABLET ORAL DAILY
Status: DISCONTINUED | OUTPATIENT
Start: 2020-08-18 | End: 2020-08-19 | Stop reason: HOSPADM

## 2020-08-18 RX ADMIN — CLOPIDOGREL 75 MG: 75 TABLET, FILM COATED ORAL at 09:08

## 2020-08-18 RX ADMIN — DEXTROSE: 10 SOLUTION INTRAVENOUS at 12:08

## 2020-08-18 RX ADMIN — ENOXAPARIN SODIUM 30 MG: 30 INJECTION SUBCUTANEOUS at 05:08

## 2020-08-18 RX ADMIN — QUETIAPINE FUMARATE 100 MG: 100 TABLET ORAL at 09:08

## 2020-08-18 RX ADMIN — AMLODIPINE BESYLATE 5 MG: 5 TABLET ORAL at 05:08

## 2020-08-18 RX ADMIN — DEXTROSE: 10 SOLUTION INTRAVENOUS at 08:08

## 2020-08-18 RX ADMIN — DEXTROSE: 10 SOLUTION INTRAVENOUS at 02:08

## 2020-08-18 RX ADMIN — DEXTROSE: 10 SOLUTION INTRAVENOUS at 03:08

## 2020-08-18 RX ADMIN — METOPROLOL TARTRATE 25 MG: 25 TABLET, FILM COATED ORAL at 09:08

## 2020-08-18 RX ADMIN — ASPIRIN 81 MG: 81 TABLET, COATED ORAL at 09:08

## 2020-08-18 RX ADMIN — ATORVASTATIN CALCIUM 40 MG: 40 TABLET, FILM COATED ORAL at 08:08

## 2020-08-18 RX ADMIN — CEFTRIAXONE SODIUM 1 G: 1 INJECTION, POWDER, FOR SOLUTION INTRAMUSCULAR; INTRAVENOUS at 12:08

## 2020-08-18 NOTE — PLAN OF CARE
Informed Dr. Hsu that patient's spouse was concern about patient's functional status.  Orders given by Dr. Hsu for PT/OT to evaluate and treat.  Orders entered as instructed and CM contacted patient's spouse that patient will be staying inpatient another day until evaluation from therapy is determined.

## 2020-08-18 NOTE — PLAN OF CARE
08/18/20 1238   Discharge Reassessment   Assessment Type Discharge Planning Reassessment   Post-Acute Status   Post-Acute Authorization Home Health   Home Health Status Referrals Sent  (Sent referral to Nursing Care.  Submitted medical necessity form to [employer]. Pending auth.)

## 2020-08-18 NOTE — ASSESSMENT & PLAN NOTE
BMP reviewed- noted Estimated Creatinine Clearance: 28 mL/min (based on SCr of 1.4 mg/dL). according to latest data. Monitor UOP and serial BMP and adjust therapy as needed. Renally dose meds.    BMP  Lab Results   Component Value Date     08/18/2020    K 4.3 08/18/2020     08/18/2020    CO2 26 08/18/2020    BUN 26 08/18/2020    CREATININE 1.4 08/18/2020    CALCIUM 8.6 (L) 08/18/2020    ANIONGAP 5 (L) 08/18/2020    ESTGFRAFRICA 50.4 (A) 08/18/2020    EGFRNONAA 43.6 (A) 08/18/2020       Lab Results   Component Value Date    CREATININE 1.4 08/18/2020    CREATININE 1.3 08/17/2020    CREATININE 2.3 (H) 08/15/2020      Continue gentle volume resuscitation.  Patient appears to be nearing his

## 2020-08-18 NOTE — PROGRESS NOTES
Cardiology Progress Note  [facility]    Today's Date:  8/18/2020  Patient Name: Guido Michaels    MRN: [id number]    Code Status: Full Code     Attending Physician: Michael Hsu Jr., MD   Consulting Physician: NILSA Camilo MD    Hospital Course:   Clinically the patient appears to be doing better.  He is more alert and responding to questions.  The nursing notes suggest that the patient has been combative.  From a clinical standpoin he appears to be less volume contracted.  Vital signs have been stable.  Telemetry has not revealed any tachy or bradycardia dysrhythmias.    Review of Systems   Constitution: Positive for decreased appetite, malaise/fatigue and weight loss.   HENT: Negative for congestion.    Cardiovascular: Negative for chest pain and dyspnea on exertion.   Respiratory: Negative for cough, shortness of breath and wheezing.    Skin: Positive for dry skin.   Gastrointestinal: Negative for abdominal pain and heartburn.   Neurological: Positive for difficulty with concentration, excessive daytime sleepiness and weakness.   All other systems reviewed and are negative.  :       Vital Signs (Most Recent):  Temp: 97.7 °F (36.5 °C) (08/18/20 0716)  Pulse: 66 (08/18/20 0722)  Resp: 18 (08/18/20 0722)  BP: 130/89 (08/18/20 0716)  SpO2: 96 % (08/18/20 0722) Vital Signs (24h Range):  Temp:  [97.5 °F (36.4 °C)-98.2 °F (36.8 °C)] 97.7 °F (36.5 °C)  Pulse:  [64-86] 66  Resp:  [18-20] 18  SpO2:  [94 %-100 %] 96 %  BP: (117-139)/(70-99) 130/89     Weight: 57.7 kg (127 lb 1.6 oz)  Body mass index is 18.77 kg/m².    SpO2: 96 %  O2 Device (Oxygen Therapy): nasal cannula      Intake/Output Summary (Last 24 hours) at 8/18/2020 0831  Last data filed at 8/18/2020 0600  Gross per 24 hour   Intake 2332 ml   Output --   Net 2332 ml       Physical Exam  General: Improved cognitive function but still disoriented.  HEENT: Oral mucosa is dry.  He has no jugular venous distention.  Lungs: Decreased breath sounds secondary  to poor inspiratory effort.  Heart: Regular rate rhythm.  Extremities: No edema.    Labs:   CMP   Recent Labs   Lab 08/17/20  0341 08/18/20  0309    140   K 3.8 4.3    109   CO2 26 26   GLU 62* 116*   BUN 26 26   CREATININE 1.3 1.4   CALCIUM 8.3* 8.6*   PROT 7.7 7.4   ALBUMIN 3.0* 2.9*   BILITOT 1.2* 0.9   ALKPHOS 44* 48*   AST 71* 59*   ALT 27 30   ANIONGAP 7* 5*   ESTGFRAFRICA 55.1* 50.4*   EGFRNONAA 47.7* 43.6*    and CBC   Recent Labs   Lab 08/17/20  0341 08/18/20  0309   WBC 7.40 6.50   HGB 12.2* 12.8*   HCT 37.2* 39.0*    148*       Imaging:   Echocardiogram:   2D echo with color flow doppler:  Results for orders placed or performed during the hospital encounter of 08/15/20   Echo Color Flow Doppler? Yes; Bubble Contrast? No   Result Value Ref Range    AV mean gradient 3 mmHg    Ao peak stevie 1.11 m/s    Ao VTI 20.87 cm    IVS 1.68 (A) 0.6 - 1.1 cm    LA size 2.29 cm    LVIDD 3.71 3.5 - 6.0 cm    LVIDS 2.63 2.1 - 4.0 cm    LVOT diameter 2.15 cm    LVOT peak VTI 14.96 cm    PW 1.30 (A) 0.6 - 1.1 cm    MV Peak A Stevie 0.66 m/s    E wave decelartion time 244.71 msec    MV Peak E Stevie 0.40 m/s    RVDD 3.54 cm    TR Max Stevie 2.57 m/s    Ao root annulus 4.32 cm    AORTIC VALVE CUSP SEPERATION 1.76 cm    PV PEAK VELOCITY 0.37 cm/s    MV stenosis pressure 1/2 time 70.97 ms    LV Diastolic Volume 58.49 mL    LV Systolic Volume 25.37 mL    LVOT peak stevie 0.69 m/s    Mr max stevie 0.03 m/s    FS 29 %    LV mass 207.23 g    Left Ventricle Relative Wall Thickness 0.70 cm    AV valve area 2.60 cm2    AV Velocity Ratio 0.62     AV index (prosthetic) 0.72     MV valve area p 1/2 method 3.10 cm2    E/A ratio 0.61     LVOT area 3.6 cm2    LVOT stroke volume 54.28 cm3    AV peak gradient 5 mmHg    LV Systolic Volume Index 14.9 mL/m2    LV Diastolic Volume Index 34.46 mL/m2    LV Mass Index 122 g/m2    Triscuspid Valve Regurgitation Peak Gradient 26 mmHg    BSA 1.67 m2    Right Atrial Pressure (from IVC) 3 mmHg    TV  rest pulmonary artery pressure 29 mmHg    Narrative    · Severe concentric left ventricular hypertrophy.  · Grade I (mild) left ventricular diastolic dysfunction consistent with   impaired relaxation.  · Local segmental wall motion abnormalities.  · Mild right ventricular enlargement.  · Mild tricuspid regurgitation.  · Mild pulmonary hypertension present.  · Normal central venous pressure (3 mmHg).  · The estimated PA systolic pressure is 29 mmHg.  · No thrombus present in the inferior vena cava.  · No mass seen in the inferior vena cava.  · The ascending aorta is mildly dilated.  · Low normal left ventricular systolic function. The estimated ejection   fraction is 50%.  · Mild aortic regurgitation.  · Mild mitral regurgitation.        Telemetry: Normal sinus rhythm.  Ventricular rates have been in the 60-80 bpm range.    Assessment and Plan:   1.  Non ST segment elevated MI:  We will continue with conservative management given his chronic comorbid state.  He is clinically stable.  Moreover, he is not complaining of chest pain.  · Continue with current therapy which includes dual anti-platelet therapy, and metoprolol.  2.  Acute renal failure:  Secondary to pre renal azotemia.  Fluids are being infused at 125 cc/hour.  He remains volume contracted, therefore we will continue with current therapy.  · Echocardiogram pending.  3.  Hypertension:  Will continue to monitor.  · Add amlodipine 5 mg daily..  4.  Hyperlipidemia:  Will check a fasting lipid profile.  He is on statin therapy.  5.  Peripheral arterial disease:  Carotid duplex scan is pending today.  6.  Dementia:  Patient's CT scan has shown chronic vas based on this, his dementia is likely multi infarct.  Moreover, he has a history of ischemic strokes.  cular changes.

## 2020-08-18 NOTE — PLAN OF CARE
Contacted Mya to follow up on status of referral for hospital bed.  Per Roxana, she will work on requesting authorization from [facility].  She states they currently have back order on mattresses but they will be working on new referral.

## 2020-08-18 NOTE — PROGRESS NOTES
"[facility] Medicine  Progress Note    Patient Name: Guido Michaels  MRN: [id number]  Patient Class: IP- Inpatient   Admission Date: 8/15/2020  Length of Stay: 1 days  Attending Physician: Michael Hsu Jr., MD  Primary Care Provider: Vinicio Walter MD        Subjective:     Principal Problem:Syncope        HPI:  Chief Complaint   Patient presents with    Loss of Consciousness       Family reports they were assisting the patient to bed when he "went limp". Family reprots pt is back to baseline. They report he has hx of stroke and dementia. Pt denies pain/complaints at this time. EMS reports pt was hypotensive in route.      Pt is a 89 y/o M who is a poor historian secondary to dementia who presents with concern for a brief episode of unresponsiveness at home.  EMS reported that the family called as he was weak and went limp for several seconds as the family was attempting to put him in bed. Pt was back to baseline mental status upon EMS arrival according to the family.        Overview/Hospital Course:  No notes on file    Past Medical History:   Diagnosis Date    Dementia     Diastolic dysfunction without heart failure 2014    History of ischemic stroke without residual deficits 2014    s/p tPA therapy    Hyperlipidemia     Hypertension        Past Surgical History:   Procedure Laterality Date    APPENDECTOMY      HERNIA REPAIR         Review of patient's allergies indicates:  No Known Allergies    No current facility-administered medications on file prior to encounter.      Current Outpatient Medications on File Prior to Encounter   Medication Sig    amLODIPine (NORVASC) 10 MG tablet Take 1 tablet by mouth once daily.    QUEtiapine (SEROQUEL) 100 MG Tab Take 1 tablet by mouth once daily.    QUEtiapine (SEROQUEL) 200 MG Tab Take 1 tablet by mouth every evening.    aspirin (ECOTRIN) 81 MG EC tablet Take 1 tablet (81 mg total) by mouth once daily.    atorvastatin (LIPITOR) 40 " MG tablet Take 1 tablet (40 mg total) by mouth once daily.     Family History     None        Tobacco Use    Smoking status: Never Smoker    Smokeless tobacco: Never Used   Substance and Sexual Activity    Alcohol use: No    Drug use: No    Sexual activity: Not on file     Review of Systems   Constitutional: Negative for chills and fever.   HENT: Negative for rhinorrhea, sneezing and sore throat.    Eyes: Negative for pain and visual disturbance.   Respiratory: Negative for cough and shortness of breath.    Cardiovascular: Negative for chest pain.   Gastrointestinal: Negative for abdominal pain, constipation and diarrhea.   Endocrine: Negative for cold intolerance and heat intolerance.   Genitourinary: Negative for difficulty urinating.   Musculoskeletal: Negative for arthralgias and joint swelling.   Skin: Negative for rash.   Allergic/Immunologic: Negative for environmental allergies.   Neurological: Negative for dizziness, syncope and weakness.   Hematological: Does not bruise/bleed easily.   Psychiatric/Behavioral: Positive for confusion. Negative for dysphoric mood. The patient is not nervous/anxious.      Objective:     Vital Signs (Most Recent):  Temp: 98.2 °F (36.8 °C) (08/17/20 1102)  Pulse: 65 (08/17/20 1102)  Resp: 20 (08/17/20 1102)  BP: 117/70 (08/17/20 1102)  SpO2: 96 % (08/17/20 1102) Vital Signs (24h Range):  Temp:  [97.7 °F (36.5 °C)-98.2 °F (36.8 °C)] 98.2 °F (36.8 °C)  Pulse:  [65-97] 65  Resp:  [20-22] 20  SpO2:  [92 %-100 %] 96 %  BP: (117-153)/() 117/70     Weight: 57.7 kg (127 lb 1.6 oz)  Body mass index is 18.77 kg/m².    Physical Exam  Vitals signs and nursing note reviewed.   Constitutional:       Appearance: Normal appearance.      Comments: Thin, chronically ill appearing.   HENT:      Head: Normocephalic and atraumatic.      Nose: Nose normal.   Eyes:      Extraocular Movements: Extraocular movements intact.      Pupils: Pupils are equal, round, and reactive to light.   Neck:       Musculoskeletal: Normal range of motion and neck supple.   Cardiovascular:      Rate and Rhythm: Normal rate and regular rhythm.      Heart sounds: No murmur. No friction rub. No gallop.    Pulmonary:      Effort: Pulmonary effort is normal.      Breath sounds: Normal breath sounds.   Abdominal:      General: Abdomen is flat. Bowel sounds are normal.      Palpations: Abdomen is soft.   Musculoskeletal:         General: No swelling or deformity.   Skin:     General: Skin is warm and dry.      Capillary Refill: Capillary refill takes less than 2 seconds.   Neurological:      General: No focal deficit present.      Mental Status: He is alert and oriented to person, place, and time.   Psychiatric:      Comments: Dementia noted           CRANIAL NERVES     CN III, IV, VI   Pupils are equal, round, and reactive to light.       Lab Results   Component Value Date    WBC 7.40 08/17/2020    RBC 3.84 (L) 08/17/2020    HGB 12.2 (L) 08/17/2020    HCT 37.2 (L) 08/17/2020    MCV 97 08/17/2020    MCH 31.8 (H) 08/17/2020    MCHC 32.8 08/17/2020    RDW 12.9 08/17/2020     08/17/2020    MPV 9.9 08/17/2020    GRAN 5.0 08/17/2020    GRAN 67.1 08/17/2020    LYMPH 1.6 08/17/2020    LYMPH 21.4 08/17/2020    MONO 0.6 08/17/2020    MONO 8.1 08/17/2020    EOS 0.2 08/17/2020    BASO 0.05 08/17/2020    EOSINOPHIL 2.4 08/17/2020    BASOPHIL 0.7 08/17/2020    MG 2.6 08/15/2020        CMP  Lab Results   Component Value Date     08/17/2020    K 3.8 08/17/2020     08/17/2020    CO2 26 08/17/2020    GLU 62 (L) 08/17/2020    BUN 26 08/17/2020    CREATININE 1.3 08/17/2020    CALCIUM 8.3 (L) 08/17/2020    PROT 7.7 08/17/2020    ALBUMIN 3.0 (L) 08/17/2020    BILITOT 1.2 (H) 08/17/2020    ALKPHOS 44 (L) 08/17/2020    AST 71 (H) 08/17/2020    ALT 27 08/17/2020    ANIONGAP 7 (L) 08/17/2020    ESTGFRAFRICA 55.1 (A) 08/17/2020    EGFRNONAA 47.7 (A) 08/17/2020        Lab Results   Component Value Date    LABBLOO No growth after 5 days.  11/24/2017    LABURIN No growth 11/24/2017        Imaging Results          CT Head Without Contrast (Final result)  Result time 08/16/20 10:10:12    Final result by Jaycee Mosley MD (08/16/20 10:10:12)                 Impression:      No acute intracranial abnormalities.  No significant change since November 2017    Severe chronic white matter disease, most likely from small vessel ischemic changes      Electronically signed by: Jaycee Mosley MD  Date:    08/16/2020  Time:    10:10             Narrative:    EXAMINATION:  CT HEAD WITHOUT CONTRAST    CLINICAL HISTORY:  Altered mental status;    CT/Cardiac Nuclear exams in prior 12 months: 0    TECHNIQUE:  Axial head CT performed without IV contrast.  Iterative reconstruction utilized.    COMPARISON:  CT head 11/24/2017    FINDINGS:  No intracranial hemorrhage, acute infarction or mass effect detected.    Diffuse confluent decreased white matter attenuation, supra and periventricular regions.  Mild cerebral atrophy, not advanced for age                               X-Ray Chest AP Portable (Final result)  Result time 08/16/20 11:30:20    Final result by Jaycee Mosley MD (08/16/20 11:30:20)                 Impression:      No acute findings      Electronically signed by: Jaycee Mosley MD  Date:    08/16/2020  Time:    11:30             Narrative:    EXAMINATION:  XR CHEST AP PORTABLE    CLINICAL HISTORY:  Chest Pain;    COMPARISON:  11/24/2017    FINDINGS:  No acute infiltrates.  Normal size cardiac silhouette.  No vascular congestion or pleural fluid                                  Significant Labs: All pertinent labs within the past 24 hours have been reviewed.    Significant Imaging: I have reviewed and interpreted all pertinent imaging results/findings within the past 24 hours.      Assessment/Plan:      * Syncope  No more recurrences continue telemetry monitoring.      Weakness  Continue physical and occupational therapy.        Rx:  semi-electric  hospital bed semi electric hospital bed    The patient has a medical condition which requires positioning of the body in ways not feasible with an ordinary bed.      The patient requires positioning of the body is in ways that are not feasible with an ordinary bed in order to alleviate pain.            NICOLE (acute kidney injury)  BMP reviewed- noted Estimated Creatinine Clearance: 28 mL/min (based on SCr of 1.4 mg/dL). according to latest data. Monitor UOP and serial BMP and adjust therapy as needed. Renally dose meds.    BMP  Lab Results   Component Value Date     08/18/2020    K 4.3 08/18/2020     08/18/2020    CO2 26 08/18/2020    BUN 26 08/18/2020    CREATININE 1.4 08/18/2020    CALCIUM 8.6 (L) 08/18/2020    ANIONGAP 5 (L) 08/18/2020    ESTGFRAFRICA 50.4 (A) 08/18/2020    EGFRNONAA 43.6 (A) 08/18/2020       Lab Results   Component Value Date    CREATININE 1.4 08/18/2020    CREATININE 1.3 08/17/2020    CREATININE 2.3 (H) 08/15/2020      Continue gentle volume resuscitation.  Patient appears to be nearing his      NSTEMI (non-ST elevated myocardial infarction)  The patient resents with an acute coronary syndrome:  NSTEMI.  We will pursue treatment per ACS protocol.    Recent Labs   Lab 08/15/20  2314 08/16/20  0115 08/16/20  0845   TROPONINI 0.069* 0.222* 0.527*        Results for orders placed or performed during the hospital encounter of 08/15/20   EKG 12-lead    Collection Time: 08/16/20 10:43 AM    Narrative    Test Reason : I21.4,    Vent. Rate : 068 BPM     Atrial Rate : 068 BPM     P-R Int : 276 ms          QRS Dur : 082 ms      QT Int : 438 ms       P-R-T Axes : 062 -55 076 degrees     QTc Int : 465 ms    Sinus rhythm with 1st degree A-V block  Left anterior fascicular block  Cannot exclude prior  Septal infarct (cited on or before 24-NOV-2017)  Abnormal ECG  When compared with ECG of 15-AUG-2020 23:08,  Premature atrial complexes are no longer Present  Vent. rate has decreased BY  43  BPM  Confirmed by Shorty ARIAS MD ([id number]) on 8/16/2020 1:22:16 PM    Referred By: [email or web address]   SELF           Confirmed By:Shorty ARIAS MD      Will consult cardiology for further eval        Essential hypertension  Blood pressure appears mildly elevated.  Will continue to closely monitor especially in the setting of ACS.      Hypovolemia  Resolving nicely with volume resuscitation        VTE Risk Mitigation (From admission, onward)         Ordered     enoxaparin injection 30 mg  Every 24 hours      08/16/20 0844     IP VTE HIGH RISK PATIENT  Once      08/16/20 0749     Place sequential compression device  Until discontinued      08/16/20 0749     Place MARIELA hose  Until discontinued      08/16/20 0749                Discharge Planning   PRUDENCIO:      Code Status: Full Code   Is the patient medically ready for discharge?:     Reason for patient still in hospital (select all that apply): Patient unstable  Discharge Plan A: Home Health                  Michael Hsu Jr, MD  Department of Hospital Medicine   [facility]

## 2020-08-18 NOTE — PLAN OF CARE
Pt restrained in bed irritated and confused throughout the night. When restraints released pt became combative. Fluids offered often along with position changes. Tolerated meds well.

## 2020-08-18 NOTE — PROGRESS NOTES
Cardiology Progress Note  [facility]    Today's Date:  8/18/2020  Patient Name: Guido Michaels    MRN: [id number]    Code Status: Full Code     Attending Physician: Michael Hsu Jr., MD   Consulting Physician: NILSA Camilo MD    Hospital Course:   The patient remains confused.  He does not appear to be in any distress.    ROS:  The patient is confused and unable to respond meaningfully to any questions.      Vital Signs (Most Recent):  Temp: 97.1 °F (36.2 °C) (08/18/20 1542)  Pulse: 62 (08/18/20 1542)  Resp: 20 (08/18/20 1542)  BP: (!) 143/82 (08/18/20 1542)  SpO2: 96 % (08/18/20 1542) Vital Signs (24h Range):  Temp:  [97.1 °F (36.2 °C)-98.6 °F (37 °C)] 97.1 °F (36.2 °C)  Pulse:  [62-78] 62  Resp:  [18-20] 20  SpO2:  [94 %-100 %] 96 %  BP: (122-143)/(80-99) 143/82     Weight: 57.7 kg (127 lb 1.6 oz)  Body mass index is 18.77 kg/m².    SpO2: 96 %  O2 Device (Oxygen Therapy): nasal cannula      Intake/Output Summary (Last 24 hours) at 8/18/2020 1711  Last data filed at 8/18/2020 0600  Gross per 24 hour   Intake 2332 ml   Output --   Net 2332 ml       Physical Exam  General:  Confused; dry oral mucosa.  Heent:  No JVD.  Lungs:  Poor inspiratory effort.  Heart:  Regular rate rhythm.  Extremities:  No edema.    Labs:   CMP   Recent Labs   Lab 08/17/20  0341 08/18/20  0309    140   K 3.8 4.3    109   CO2 26 26   GLU 62* 116*   BUN 26 26   CREATININE 1.3 1.4   CALCIUM 8.3* 8.6*   PROT 7.7 7.4   ALBUMIN 3.0* 2.9*   BILITOT 1.2* 0.9   ALKPHOS 44* 48*   AST 71* 59*   ALT 27 30   ANIONGAP 7* 5*   ESTGFRAFRICA 55.1* 50.4*   EGFRNONAA 47.7* 43.6*    and CBC   Recent Labs   Lab 08/17/20  0341 08/18/20  0309   WBC 7.40 6.50   HGB 12.2* 12.8*   HCT 37.2* 39.0*    148*       Imaging:   X-Ray: CXR: X-Ray Chest 1 View (CXR): No results found for this visit on 08/15/20.    Assessment and Plan:     1.  Non ST segment elevated MI:  We will continue with conservative management given his chronic comorbid  state.  He is clinically stable.  Moreover, he is not complaining of chest pain.  · Continue with current therapy which includes dual anti-platelet therapy, and metoprolol.  · My overall recommendations are conservative management.  The patient is a poor candidate for revascularization.  From a cardiovascular perspective, he is on optimal therapy.  2.  Acute renal failure:  Secondary to pre renal azotemia.   his creatinine has normalized.  Although clinically, oral mucosa remains dry and he is volume contracted.  ·  Increased fluid rate to 100 cc/hour.  Also would recommend liberalizing p.o. fluid intake.  3.  Hypertension:  Will continue to monitor.  · A add amlodipine 5 mg q.d. to his regimen.  My initial intention was to add ARB or Ace inhibitor therapy.  However given the patient's predisposition for volume contraction and renal failure, I do not believe that this would be the best option for him.  5.  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

## 2020-08-18 NOTE — PT/OT/SLP EVAL
Occupational Therapy  Evaluation    Guido Michaels   MRN: [id number]   Admitting Diagnosis: Syncope    OT Date of Treatment: 08/18/20   OT Start Time: 1330  OT Stop Time: 1348  OT Total Time (min): 18 min    Billable Minutes:  Evaluation 18 minutes  Total Minutes: 18 minutes    Diagnosis: Syncope      Past Medical History:   Diagnosis Date    Dementia     Diastolic dysfunction without heart failure 2014    History of ischemic stroke without residual deficits 2014    s/p tPA therapy    Hyperlipidemia     Hypertension       Past Surgical History:   Procedure Laterality Date    APPENDECTOMY      HERNIA REPAIR         Referring physician: Dr. Hsu  Date referred to OT: 8/18/20    General Precautions: Standard, fall  Orthopedic Precautions:    Braces:        Patient History:  Pt is a 89 year old male that presents with a medical dx of syncope. Pt presents with deficits including, decreased endurance, impaired balance, decrease cognitive processing and understanding, decreased BUE strength, decreased t/f ability, and decreased safety awareness.       Prior level of function:    Bed Mobility/Transfers: needs assist  Grooming: needs assist  Bathing: needs assist  Upper Body Dressing: needs assist  Lower Body Dressing: needs assist  Toileting: needs assist        Dominant hand: unknown due to pt unable to answer questions due to cognitive status    Subjective:  Communicated with PT prior to session. Pt speech was unintelligible. He was unable to answer questions appropriately of report any subjective information helpful to the initial evaluation.     Pain: Patient unable to give verbal reports of pain, but unknown if pt understood question.    Chief Complaint: unknown- unable to verbalize appropriately  Patient/Family stated goals: unable to verbalize appropriately due to cognitive limitations      Objective:       Cognitive Exam:  Oriented to: not oriented. Unable to appropriately verbalize orientation  Follows  Commands/attention: unable to follow commands due to cognitive limitations  Communication: speech was unintelligible  Safety awareness/insight to disability: impaired  Coping skills/emotional control: pt was attempting to communicate, but speech was unable to be comprehended and understood    Visual/perceptual:  Impaired  motor planning praxis    Physical Exam:  Postural examination/scapula alignment:    -       Rounded shoulders  Skin integrity: Visible skin intact  Edema: None noted     Sensation:   Unable to determine. Pt could not answer    Upper Extremity Range of Motion:  Right Upper Extremity: WFL  Left Upper Extremity: WFL    Upper Extremity Strength:  Right Upper Extremity: Deficits: 3/5 in all planes- limited accuracy due to pts inability to following commands to accurately perform MMT  Left Upper Extremity: Deficits: 3/5 in all planes-  limited accuracy due to pts inability to following commands to accurately perform MMT   Strength: WFL    Fine motor coordination:      -       Impaired due to pt limited cognitive abilities and limited understand/ ability to following commands to perform FMC    Gross motor coordination: Impaired due to pt limited cognitive abilities and limited understand/ ability to following commands to perform FMC    Functional Mobility:  Bed Mobility:   Supine to sit: Maximum Assistance   Sit to supine: Maximum Assistance   Rolling: Maximum Assistance   Scooting: Maximum Assistance    Transfers:   Sit to stand:Maximum Assistance with Rolling Walker limited due to pts inability to following commands to perform physical actions    Feeding:  NT on initial eval    Grooming:  Max A due to poor sequencing and inability to follow commands    Bathing:  NT today    UE Dressing:  Max A due to poor sequencing and inability to follow commands    LE Dressing:  Max A due to poor sequencing and inability to follow commands    Toileting:  NT today    Balance:   Static Sit: FAIR: Maintains without  assist, but unable to take any challenges   Dynamic Sit:  FAIR-: Cannot move trunk without losing balance  Static Stand: Unable to stand w/o max A today on eval, unable to assess standing balance today  Dynamic stand: Unable to stand w/o Max A and assess standing balance today        Patient left HOB elevated with all lines intact and call button in reach    Assessment:  Guido Michaels is a 89 y.o. male with a medical diagnosis of Syncope and presents with decreased endurance, impaired functional static and dynamic balance, limited cognitive awareness and processing skills, decrease BUE strength, decreased safety awareness.All above deficits impacting adl performance and safe t/f.    Rehab potential is poor    Activity tolerance: Fair    Discharge recommendations: SNF, home with 24 hour supervisions    Equipment recommendations: RW, hospital bed, BSC, grab bars, shower chair, wheelchair    GOALS:   Multidisciplinary Problems     Occupational Therapy Goals        Problem: Occupational Therapy Goal    Goal Priority Disciplines Outcome Interventions   Occupational Therapy Goal     OT, PT/OT     Description: Goals to be met by: 8/21/20    Patient will increase functional independence with ADLs by performing:    UE Dressing with Moderate Assistance.  LE Dressing with Moderate Assistance.  Sitting at edge of bed x 10 minutes with Minimal Assistance.  Rolling to Bilateral with Contact Guard Assistance.   Supine to sit with Minimal Assistance.  Stand pivot transfers with Minimal Assistance.  Increased functional strength to 5/5 for adl performance.                       PLAN: Patient to be seen 6 x week to address the above listed problems via therapeutic ax, therapeutic ex, A/PROM, adl performance, self care,neuro re ed, and manual  Plan of Care expires: 8/21/20  Plan of Care reviewed with: patient. Due to cognitive limitations pt unable to report any awareness or understanding of POC.         Sachi Contreras,  OT  08/18/2020

## 2020-08-18 NOTE — PT/OT/SLP EVAL
"Physical Therapy Evaluation    Patient Name:  Guido Michaels   MRN:  [id number]    Recommendations:     Discharge Recommendations:  (To be determined)   Discharge Equipment Recommendations: (To be determined)   Barriers to discharge: Per chart review patient lives with his wife.     Assessment:     Guido Michaels is a 89 y.o. male admitted with a medical diagnosis of Syncope.  He presents with the following impairments/functional limitations:    impaired cognition, balance, coordination, safety, overall mobility.    Rehab Prognosis: Fair; patient would benefit from acute skilled PT services to address these deficits and reach maximum level of function.    Recent Surgery: * No surgery found *      Plan:     During this hospitalization, patient to be seen 6 x/week to address the identified rehab impairments via gait training, therapeutic activities, therapeutic exercises and progress toward the following goals:    · Plan of Care Expires:  08/25/20    Subjective     Chief Complaint:   Loss of Consciousness       Family reports they were assisting the patient to bed when he "went limp". Family reprots pt is back to baseline. They report he has hx of stroke and dementia. Pt denies pain/complaints at this time. EMS reports pt was hypotensive in route.          HPI: 89 y/o male with history of adv dementia, htn, hld, h/o cva with no significant deficts presented to the ed via ems after he was found to have a brief episode of loss of consciousness and back to his baseline in few minutes and found to be hypotensive enroute and further work up suggestive of evangelista and nstemi and further admitted on ivf along with acs protocol with cardiology consult          Past Medical History:   Diagnosis Date    Dementia      Diastolic dysfunction without heart failure 2014    History of ischemic stroke without residual deficits 2014     s/p tPA therapy    Hyperlipidemia      Hypertension           Past Surgical History:   Procedure " Laterality Date    APPENDECTOMY        HERNIA REPAIR         Patient/Family Comments/goals: Patient with incoherent verbalizations throughout evaluation.   Pain/Comfort:  · Pain Rating 1: (Patient does not appear to be in pain. )      Living Environment:  Per chart review patient lives with wife and was ambulatory prior to hospitalization. Patient needed assistance with mobility including dressing and bathing.   Prior to admission, patients level of function was needed assistance.  Equipment used at home: bedside commode, bath bench, walker, rolling, wheelchair.  Upon discharge, patient will have assistance from wife and home health.    Objective:     Patient found supine with oxygen, peripheral IV, SCD, telemetry, soft wrist restraints  upon PT entry to room.    General Precautions: Standard, fall   Exams:  · RLE ROM: limited active due to muscle insufficiency  · RLE Strength: limited active due to muscle insufficiency  · LLE ROM: limited active due to muscle insufficiency  · LLE Strength: limited active due to muscle insufficiency    Functional Mobility:  · Bed Mobility:     · Rolling Left:  maximal assistance  · Rolling Right: maximal assistance  · Supine to Sit: maximal assistance  · Sit to Supine: maximal assistance  · Transfers:     · Sit to Stand:  maximal assistance with 2 person assist  · Gait: unable  · Balance: sitting static/dynamic: fair -/poor+    Patient left supine with all lines intact.    GOALS:   Multidisciplinary Problems     Physical Therapy Goals        Problem: Physical Therapy Goal    Goal Priority Disciplines Outcome Goal Variances Interventions   Physical Therapy Goal     PT, PT/OT                      History:     Past Medical History:   Diagnosis Date    Dementia     Diastolic dysfunction without heart failure 2014    History of ischemic stroke without residual deficits 2014    s/p tPA therapy    Hyperlipidemia     Hypertension        Past Surgical History:   Procedure Laterality  Date    APPENDECTOMY      HERNIA REPAIR         Time Tracking:     PT Received On: 08/18/20  PT Start Time: 1330     PT Stop Time: 1348  PT Total Time (min): 18 min     Billable Minutes: Evaluation 18      Ani Bae, PT  08/18/2020

## 2020-08-19 VITALS
HEIGHT: 69 IN | BODY MASS INDEX: 18.83 KG/M2 | SYSTOLIC BLOOD PRESSURE: 122 MMHG | RESPIRATION RATE: 20 BRPM | HEART RATE: 65 BPM | OXYGEN SATURATION: 100 % | DIASTOLIC BLOOD PRESSURE: 86 MMHG | WEIGHT: 127.13 LBS | TEMPERATURE: 98 F

## 2020-08-19 PROBLEM — R55 SYNCOPE: Status: RESOLVED | Noted: 2020-08-16 | Resolved: 2020-08-19

## 2020-08-19 PROBLEM — N12 PYELONEPHRITIS: Status: RESOLVED | Noted: 2017-11-24 | Resolved: 2020-08-19

## 2020-08-19 PROBLEM — N17.9 ACUTE RENAL FAILURE: Status: RESOLVED | Noted: 2017-11-24 | Resolved: 2020-08-19

## 2020-08-19 PROBLEM — A41.9 SEPSIS: Status: RESOLVED | Noted: 2017-11-24 | Resolved: 2020-08-19

## 2020-08-19 LAB
ALBUMIN SERPL BCP-MCNC: 2.8 G/DL (ref 3.5–5.2)
ALP SERPL-CCNC: 48 U/L (ref 55–135)
ALT SERPL W/O P-5'-P-CCNC: 28 U/L (ref 10–44)
ANION GAP SERPL CALC-SCNC: 5 MMOL/L (ref 8–16)
AST SERPL-CCNC: 47 U/L (ref 10–40)
BASOPHILS # BLD AUTO: 0.03 K/UL (ref 0–0.2)
BASOPHILS NFR BLD: 0.4 % (ref 0–1.9)
BILIRUB SERPL-MCNC: 1 MG/DL (ref 0.1–1)
BUN SERPL-MCNC: 16 MG/DL (ref 10–30)
CALCIUM SERPL-MCNC: 8.4 MG/DL (ref 8.7–10.5)
CHLORIDE SERPL-SCNC: 104 MMOL/L (ref 95–110)
CO2 SERPL-SCNC: 27 MMOL/L (ref 23–29)
CREAT SERPL-MCNC: 1.2 MG/DL (ref 0.5–1.4)
DIFFERENTIAL METHOD: ABNORMAL
EOSINOPHIL # BLD AUTO: 0.2 K/UL (ref 0–0.5)
EOSINOPHIL NFR BLD: 2.8 % (ref 0–8)
ERYTHROCYTE [DISTWIDTH] IN BLOOD BY AUTOMATED COUNT: 12.6 % (ref 11.5–14.5)
EST. GFR  (AFRICAN AMERICAN): >60 ML/MIN/1.73 M^2
EST. GFR  (NON AFRICAN AMERICAN): 52.5 ML/MIN/1.73 M^2
GLUCOSE SERPL-MCNC: 107 MG/DL (ref 70–110)
HCT VFR BLD AUTO: 35.6 % (ref 40–54)
HGB BLD-MCNC: 11.8 G/DL (ref 14–18)
IMM GRANULOCYTES # BLD AUTO: 0.01 K/UL (ref 0–0.04)
IMM GRANULOCYTES NFR BLD AUTO: 0.1 % (ref 0–0.5)
LYMPHOCYTES # BLD AUTO: 2 K/UL (ref 1–4.8)
LYMPHOCYTES NFR BLD: 28.4 % (ref 18–48)
MAGNESIUM SERPL-MCNC: 2.1 MG/DL (ref 1.6–2.6)
MCH RBC QN AUTO: 31.5 PG (ref 27–31)
MCHC RBC AUTO-ENTMCNC: 33.1 G/DL (ref 32–36)
MCV RBC AUTO: 95 FL (ref 82–98)
MONOCYTES # BLD AUTO: 0.6 K/UL (ref 0.3–1)
MONOCYTES NFR BLD: 8.7 % (ref 4–15)
NEUTROPHILS # BLD AUTO: 4.1 K/UL (ref 1.8–7.7)
NEUTROPHILS NFR BLD: 59.6 % (ref 38–73)
NRBC BLD-RTO: 0 /100 WBC
PLATELET # BLD AUTO: 149 K/UL (ref 150–350)
PMV BLD AUTO: 9.9 FL (ref 9.2–12.9)
POCT GLUCOSE: 99 MG/DL (ref 70–110)
POTASSIUM SERPL-SCNC: 3.5 MMOL/L (ref 3.5–5.1)
PROT SERPL-MCNC: 7.2 G/DL (ref 6–8.4)
RBC # BLD AUTO: 3.75 M/UL (ref 4.6–6.2)
SODIUM SERPL-SCNC: 136 MMOL/L (ref 136–145)
WBC # BLD AUTO: 6.86 K/UL (ref 3.9–12.7)

## 2020-08-19 PROCEDURE — 94761 N-INVAS EAR/PLS OXIMETRY MLT: CPT

## 2020-08-19 PROCEDURE — 80053 COMPREHEN METABOLIC PANEL: CPT

## 2020-08-19 PROCEDURE — 36415 COLL VENOUS BLD VENIPUNCTURE: CPT

## 2020-08-19 PROCEDURE — 83735 ASSAY OF MAGNESIUM: CPT

## 2020-08-19 PROCEDURE — 25000003 PHARM REV CODE 250: Performed by: INTERNAL MEDICINE

## 2020-08-19 PROCEDURE — 97530 THERAPEUTIC ACTIVITIES: CPT

## 2020-08-19 PROCEDURE — 99900035 HC TECH TIME PER 15 MIN (STAT)

## 2020-08-19 PROCEDURE — 85025 COMPLETE CBC W/AUTO DIFF WBC: CPT

## 2020-08-19 RX ORDER — METOPROLOL SUCCINATE 50 MG/1
50 TABLET, EXTENDED RELEASE ORAL DAILY
Qty: 30 TABLET | Refills: 1 | Status: SHIPPED | OUTPATIENT
Start: 2020-08-19 | End: 2021-08-19

## 2020-08-19 RX ORDER — CLOPIDOGREL BISULFATE 75 MG/1
75 TABLET ORAL DAILY
Qty: 30 TABLET | Refills: 1 | Status: SHIPPED | OUTPATIENT
Start: 2020-08-19 | End: 2021-08-19

## 2020-08-19 RX ADMIN — ASPIRIN 81 MG: 81 TABLET, COATED ORAL at 08:08

## 2020-08-19 RX ADMIN — AMLODIPINE BESYLATE 5 MG: 5 TABLET ORAL at 08:08

## 2020-08-19 RX ADMIN — DEXTROSE: 10 SOLUTION INTRAVENOUS at 03:08

## 2020-08-19 RX ADMIN — QUETIAPINE FUMARATE 100 MG: 100 TABLET ORAL at 08:08

## 2020-08-19 RX ADMIN — CLOPIDOGREL 75 MG: 75 TABLET, FILM COATED ORAL at 08:08

## 2020-08-19 RX ADMIN — METOPROLOL SUCCINATE 50 MG: 50 TABLET, EXTENDED RELEASE ORAL at 08:08

## 2020-08-19 NOTE — ASSESSMENT & PLAN NOTE
BMP reviewed- noted Estimated Creatinine Clearance: 32.7 mL/min (based on SCr of 1.2 mg/dL). according to latest data. Monitor UOP and serial BMP and adjust therapy as needed. Renally dose meds.    BMP  Lab Results   Component Value Date     08/19/2020    K 3.5 08/19/2020     08/19/2020    CO2 27 08/19/2020    BUN 16 08/19/2020    CREATININE 1.2 08/19/2020    CALCIUM 8.4 (L) 08/19/2020    ANIONGAP 5 (L) 08/19/2020    ESTGFRAFRICA >60.0 08/19/2020    EGFRNONAA 52.5 (A) 08/19/2020       Lab Results   Component Value Date    CREATININE 1.2 08/19/2020    CREATININE 1.4 08/18/2020    CREATININE 1.3 08/17/2020      Continue gentle volume resuscitation.  Patient appears to be nearing his

## 2020-08-19 NOTE — ASSESSMENT & PLAN NOTE
The patient resents with an acute coronary syndrome:  NSTEMI.  We will pursue treatment per ACS protocol.    Recent Labs   Lab 08/15/20  2314 08/16/20  0115 08/16/20  0845   TROPONINI 0.069* 0.222* 0.527*        Results for orders placed or performed during the hospital encounter of 08/15/20   EKG 12-lead    Collection Time: 08/16/20 10:43 AM    Narrative    Test Reason : I21.4,    Vent. Rate : 068 BPM     Atrial Rate : 068 BPM     P-R Int : 276 ms          QRS Dur : 082 ms      QT Int : 438 ms       P-R-T Axes : 062 -55 076 degrees     QTc Int : 465 ms    Sinus rhythm with 1st degree A-V block  Left anterior fascicular block  Cannot exclude prior  Septal infarct (cited on or before 24-NOV-2017)  Abnormal ECG  When compared with ECG of 15-AUG-2020 23:08,  Premature atrial complexes are no longer Present  Vent. rate has decreased BY  43 BPM  Confirmed by Shorty ARIAS MD ([id number]) on 8/16/2020 1:22:16 PM    Referred By: CHRISTIANERR   SELF           Confirmed By:Shorty ARIAS MD      Patient evaluated by Cardiology while at our facility.  He has been placed on Plavix and metoprolol in addition to his home medical therapy.  He is advised to continue treatment as prescribed and keep follow-up appointment with Cardiology in the outpatient setting.

## 2020-08-19 NOTE — PT/OT/SLP PROGRESS
Occupational Therapy  Treatment    Guido Michaels   MRN: [id number]   Admitting Diagnosis: Syncope    OT Date of Treatment: 08/19/20   OT Start Time: 1004  OT Stop Time: 1015  OT Total Time (min): 11 min    Billable Minutes:  Therapeutic Activity 11    General Precautions: Standard, fall  Orthopedic Precautions:    Braces:           Subjective:  Communicated with PT prior to session. Pt speech was still unintelligible and he was unable to verbalize needs and wants.    Pain: unable to verbalize pain. Facial grimaces noted with movements, but unable to verbalize a numeric pain.    Objective:       Functional Mobility:  Bed Mobility: Max A- inability to follow commands      Transfers: Max A- inability to follow commands to initiate or properly sequence transfer techniques.       Activities of Daily Living:     Feeding: Max A    UE dressing: Total A     LE dressing: Total A     Bathing: Total A    Toileting: Total A    Pt unable to initiate adl steps appropriately. Cannot cognitively process adl performance. Requiring maximum supervision and assisatnce.     Balance:   Static Sit: FAIR: Maintains without assist, but unable to take any challenges   Dynamic Sit: FAIR - : Cannot move trunk without losing balance  Static Stand: NT today  Dynamic stand: NT today    Therapeutic Activities and Exercises:  Performed supine to sit EOB with Max A. Pt unable to initiate transfer due to decreased cognitive abilities. Unable to appropriately sequence steps.  Fair static sitting at EOB. Fair - dynamic sitting. Unable to following commands.  Sat upright EOB for ~5-10 minutes with constant supervision.   Returned to supine with Max A for LE guiding and lifting.    AM-PAC 6 CLICK ADL   How much help from another person does this patient currently need?   1 = Unable, Total/Dependent Assistance  2 = A lot, Maximum/Moderate Assistance  3 = A little, Minimum/Contact Guard/Supervision  4 = None, Modified [place]/Independent    Putting on  "and taking off regular lower body clothing? : 1  Bathing (including washing, rinsing, drying)?: 1  Toileting, which includes using toilet, bedpan, or urinal? : 1  Putting on and taking off regular upper body clothing?: 1  Taking care of personal grooming such as brushing teeth?: 1  Eating meals?: 1  Daily Activity Total Score: 6     AM-PAC Raw Score CMS "G-Code Modifier Level of Impairment Assistance   6 % Total / Unable   7 - 8 CM 80 - 100% Maximal Assist   9-13 CL 60 - 80% Moderate Assist   14 - 19 CK 40 - 60% Moderate Assist   20 - 22 CJ 20 - 40% Minimal Assist   23 CI 1-20% SBA / CGA   24 CH 0% Independent/ Mod I       Patient left supine with all lines intact and call button in reach    ASSESSMENT:  Guido Michaels is a 89 y.o. male with a medical diagnosis of Syncope and presents with decreased endurance, decreased fxnal balance, decreased transfer ability, decreased safety awareness, decreased strength, and decreased cognitive abilities. Deficits limiting pts ability to appropriately and accurately complete adls and safe t/f.    Rehab potential is poor.    Activity tolerance: Poor    Discharge recommendations: SNF, Home with home health, home with 24 hour supervision/ total care    Equipment recommendations: hospital bed, BSC, RW, grab bars, shower bench, wheelchair    GOALS:   Multidisciplinary Problems     Occupational Therapy Goals        Problem: Occupational Therapy Goal    Goal Priority Disciplines Outcome Interventions   Occupational Therapy Goal     OT, PT/OT Ongoing, Not Progressing    Description: Goals to be met by: 8/21/20    Patient will increase functional independence with ADLs by performing:    UE Dressing with Moderate Assistance.  LE Dressing with Moderate Assistance.  Sitting at edge of bed x 10 minutes with Minimal Assistance.  Rolling to Bilateral with Contact Guard Assistance.   Supine to sit with Minimal Assistance.  Stand pivot transfers with Minimal Assistance.  Increased " functional strength to 5/5 for adl performance.                       Plan:  Patient to be seen 6x week to address the above listed problems via therapeutic ax, therapeutic ex, manual, adl performance, neuro re ed, safety awareness, transfers  Plan of Care expires: 8/21/20  Plan of Care reviewed with: Patient- unable to verbalize understanding         Sachi Contreras OT  08/19/2020

## 2020-08-19 NOTE — PLAN OF CARE
08/19/20 0900   Discharge Reassessment   Assessment Type Discharge Planning Reassessment   Post-Acute Status   Post-Acute Authorization Home Health   Home Health Status Set-up Complete     Home Health w/Nursing Care approved from [employer].  Auth# [id number]

## 2020-08-19 NOTE — PLAN OF CARE
CHAPARRITA contacted [facility] [place] to follow up hospital bed status.  Roxana states they are still waiting for insurance approval.

## 2020-08-19 NOTE — PLAN OF CARE
Pt resting in bed restrained, not as agitated as he was the previous night. No signs of pain noted. Tolerated meds well. Iv intact and transfusing fluids per dr orders.

## 2020-08-19 NOTE — PROGRESS NOTES
Cardiology Progress Note  [facility]    Today's Date:  8/19/2020  Patient Name: Guido Michaels    MRN: [id number]    Code Status: Full Code     Attending Physician: Michael Hsu Jr., MD   Consulting Physician: NILSA Camilo MD    Hospital Course:   The patient during his hospitalization has continued to make up appropriate the clinical progress.  His renal function has improved with hydration.  This morning the patient is more alert, and communicating appropriately.  He is not complaining of chest pain or shortness of breath.    Review of Systems   Constitution: Positive for weight loss.   Cardiovascular: Positive for dyspnea on exertion. Negative for chest pain, claudication, leg swelling and palpitations.   Respiratory: Positive for shortness of breath.    Skin: Positive for dry skin.   Psychiatric/Behavioral: Positive for altered mental status and memory loss.   All other systems reviewed and are negative.        Vital Signs (Most Recent):  Temp: 97.7 °F (36.5 °C) (08/19/20 1201)  Pulse: 65 (08/19/20 1201)  Resp: 20 (08/19/20 1201)  BP: 122/86 (08/19/20 1201)  SpO2: 100 % (08/19/20 1201) Vital Signs (24h Range):  Temp:  [97.1 °F (36.2 °C)-98.2 °F (36.8 °C)] 97.7 °F (36.5 °C)  Pulse:  [62-87] 65  Resp:  [20] 20  SpO2:  [95 %-100 %] 100 %  BP: (122-162)/(82-99) 122/86     Weight: 57.7 kg (127 lb 1.6 oz)  Body mass index is 18.77 kg/m².    SpO2: 100 %  O2 Device (Oxygen Therapy): room air      Intake/Output Summary (Last 24 hours) at 8/19/2020 1220  Last data filed at 8/19/2020 0600  Gross per 24 hour   Intake 2903 ml   Output --   Net 2903 ml       Physical Exam:  General:  More alert.  Still somewhat disoriented given his underlying dementia.  Heent:  Oral mucosa remains dry but improved.  Lungs:  Decreased breath sounds secondary to poor inspiratory effort; no wheezing appreciated.  Heart:  Regular rhythm.  Extremities:  No edema.    Labs:   CMP   Recent Labs   Lab 08/18/20  0309 08/19/20  0359     136   K 4.3 3.5    104   CO2 26 27   * 107   BUN 26 16   CREATININE 1.4 1.2   CALCIUM 8.6* 8.4*   PROT 7.4 7.2   ALBUMIN 2.9* 2.8*   BILITOT 0.9 1.0   ALKPHOS 48* 48*   AST 59* 47*   ALT 30 28   ANIONGAP 5* 5*   ESTGFRAFRICA 50.4* >60.0   EGFRNONAA 43.6* 52.5*    and CBC   Recent Labs   Lab 08/18/20  0309 08/19/20  0359   WBC 6.50 6.86   HGB 12.8* 11.8*   HCT 39.0* 35.6*   * 149*       Assessment and Plan:     1.  Non ST segment elevated MI:  We will continue with conservative management given his chronic comorbid state.  He is clinically stable.  Moreover, he is not complaining of chest pain.  · Continue with current therapy which includes dual anti-platelet therapy, and metoprolol.  · My overall recommendations are conservative management.  The patient is a poor candidate for revascularization.  From a cardiovascular perspective, he is on optimal therapy.  2.  Acute renal failure:  Secondary to pre renal azotemia.   his creatinine has normalized.  Although clinically, oral mucosa remains dry and he is volume contracted.  ·  Increased fluid rate to 100 cc/hour.  Also would recommend liberalizing p.o. fluid intake.  3.  Hypertension:  Will continue to monitor.  · A add amlodipine 5 mg q.d. to his regimen.  My initial intention was to add ARB or Ace inhibitor therapy.  However given the patient's predisposition for volume contraction and renal failure, I do not believe that this would be the best option for him.  5.  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        The patient is scheduled for discharge today.  From a cardiovascular standpoint, he is ready for discharge.

## 2020-08-19 NOTE — ASSESSMENT & PLAN NOTE
Continue physical and occupational therapy through home health at discharge.        Rx:  semi-electric hospital bed semi electric hospital bed    The patient has a medical condition which requires positioning of the body in ways not feasible with an ordinary bed.      The patient requires positioning of the body is in ways that are not feasible with an ordinary bed in order to alleviate pain.

## 2020-08-19 NOTE — PHYSICIAN QUERY
PT Name: Guido Michaels  MR #: [id number]    Neurological Condition Clarification       CDS/: Marichuy Singh RN             Contact information: [email or web address]     This form is a permanent document in the medical record.     Query withdrawn, pt discharged.    Query Date: August 19, 2020    By submitting this query, we are merely seeking further clarification of documentation. Please utilize your independent clinical judgment when addressing the question(s) below.    The Medical Record contains the following:   Indicators   Supporting Clinical Findings Location in Medical Record   x AMS, Confusion, Sundowning documented Psychiatric/Behavioral: Positive for altered mental status.    Progress Note 8/17       Cardiology     Radiology findings      Medication      Treatment     x Behavioral Disturbance documented Pt restrained in bed irritated and confused throughout the night. When restraints released pt became combative. Fluids offered often along with position changes. Tolerated meds well.    Care Plan 8/18       LPN   x Other Dementia: Patient's CT scan has shown chronic vas based on this, his dementia is likely multi infarct. Moreover, he has a history of ischemic strokes.   Progress Note 8/17       Cardiology          Provider, please specify the diagnosis or diagnoses associated with above clinical findings.    [   ] Vascular Dementia with behavioral disturbances     [   ] Vascular Dementia without behavioral disturbances     [   ] Other neurological diagnosis (please specify): _______     [  ] Clinically Undetermined         Please document in your progress notes daily for the duration of treatment, until resolved, and include in your discharge summary.

## 2020-08-19 NOTE — PLAN OF CARE
Contacted patient's spouse to give inform her that patient was evaluated by PT/OT and their evaluation states patient needs max assist.  Patient's spouse states she will be taking patient home and that she has assistance from her daughter and son that live with them.  Informed her patient will be discharge with home health and therapy.  Informed her we are still awaiting approval from insurance for hospital bed.  Informed her that it is currently under review per [facility].

## 2020-08-19 NOTE — PLAN OF CARE
08/19/20 0927   Final Note   Assessment Type Final Discharge Note   Anticipated Discharge Disposition Home-Health   What phone number can be called within the next 1-3 days to see how you are doing after discharge? [phone]   Hospital Follow Up  Appt(s) scheduled? Yes   Discharge plans and expectations educations in teach back method with documentation complete? Yes   Post-Acute Status   Post-Acute Authorization Home Health;HME   HME Status Pending Payor Medical Review   Home Health Status Set-up Complete   Patient choice form signed by patient/caregiver List with quality metrics by geographic area provided   Discharge Delays None known at this time     Follow-up Information     PCP In 1 week.           [facility].    Specialties: Psychology, Internal Medicine, Gynecology, Dental General Practice  Why: September 3, 2020 @ 0815  Contact information:  [address] [facility] [address]  [phone]

## 2020-08-19 NOTE — DISCHARGE SUMMARY
"[facility] Medicine  Discharge Summary      Patient Name: Guido Michaels  MRN: [id number]  Admission Date: 8/15/2020  Hospital Length of Stay: 2 days  Discharge Date and Time:  08/19/2020 9:00 AM  Attending Physician: Michael Hsu Jr., MD   Discharging Provider: Michael Hsu Jr, MD  Primary Care Provider: Vinicio Walter MD      HPI:   Chief Complaint   Patient presents with    Loss of Consciousness       Family reports they were assisting the patient to bed when he "went limp". Family reprots pt is back to baseline. They report he has hx of stroke and dementia. Pt denies pain/complaints at this time. EMS reports pt was hypotensive in route.      Pt is a 89 y/o M who is a poor historian secondary to dementia who presents with concern for a brief episode of unresponsiveness at home.  EMS reported that the family called as he was weak and went limp for several seconds as the family was attempting to put him in bed. Pt was back to baseline mental status upon EMS arrival according to the family.        * No surgery found *      Hospital Course:   No notes on file     Consults:   Consults (From admission, onward)        Status Ordering Provider     Inpatient consult to Cardiology  Once     Provider:  Ryder Camilo MD    Acknowledged MICHAEL HSU JR     Inpatient consult to Cardiology  Once     Provider:  Ryder Camilo MD    Acknowledged MICHAEL HSU JR     Inpatient consult to Social Work/Case Management  Once     Provider:  (Not yet assigned)    MICHAEL Suarez JR          Weakness  Continue physical and occupational therapy through home health at discharge.        Rx:  semi-electric hospital bed semi electric hospital bed    The patient has a medical condition which requires positioning of the body in ways not feasible with an ordinary bed.      The patient requires positioning of the body is in ways that are not feasible with an ordinary bed in " order to alleviate pain.            NICOLE (acute kidney injury)  BMP reviewed- noted Estimated Creatinine Clearance: 32.7 mL/min (based on SCr of 1.2 mg/dL). according to latest data. Monitor UOP and serial BMP and adjust therapy as needed. Renally dose meds.    BMP  Lab Results   Component Value Date     08/19/2020    K 3.5 08/19/2020     08/19/2020    CO2 27 08/19/2020    BUN 16 08/19/2020    CREATININE 1.2 08/19/2020    CALCIUM 8.4 (L) 08/19/2020    ANIONGAP 5 (L) 08/19/2020    ESTGFRAFRICA >60.0 08/19/2020    EGFRNONAA 52.5 (A) 08/19/2020       Lab Results   Component Value Date    CREATININE 1.2 08/19/2020    CREATININE 1.4 08/18/2020    CREATININE 1.3 08/17/2020      Continue gentle volume resuscitation.  Patient appears to be nearing his      NSTEMI (non-ST elevated myocardial infarction)  The patient resents with an acute coronary syndrome:  NSTEMI.  We will pursue treatment per ACS protocol.    Recent Labs   Lab 08/15/20  2314 08/16/20  0115 08/16/20  0845   TROPONINI 0.069* 0.222* 0.527*        Results for orders placed or performed during the hospital encounter of 08/15/20   EKG 12-lead    Collection Time: 08/16/20 10:43 AM    Narrative    Test Reason : I21.4,    Vent. Rate : 068 BPM     Atrial Rate : 068 BPM     P-R Int : 276 ms          QRS Dur : 082 ms      QT Int : 438 ms       P-R-T Axes : 062 -55 076 degrees     QTc Int : 465 ms    Sinus rhythm with 1st degree A-V block  Left anterior fascicular block  Cannot exclude prior  Septal infarct (cited on or before 24-NOV-2017)  Abnormal ECG  When compared with ECG of 15-AUG-2020 23:08,  Premature atrial complexes are no longer Present  Vent. rate has decreased BY  43 BPM  Confirmed by Shorty ARIAS MD ([id number]) on 8/16/2020 1:22:16 PM    Referred By: [email or web address]   SELF           Confirmed By:Shorty ARIAS MD      Patient evaluated by Cardiology while at our facility.  He has been placed on Plavix and metoprolol in addition to his home medical therapy.  He  "is advised to continue treatment as prescribed and keep follow-up appointment with Cardiology in the outpatient setting.        Essential hypertension  Blood pressure appears mildly elevated.  Will continue to closely monitor especially in the setting of ACS.    Overall patient's blood pressure has stabilized.  Continue metoprolol per cardiology's recommendation after discharge.      Hypovolemia  This has resolved with volume resuscitation.        Final Active Diagnoses:    Diagnosis Date Noted POA    NICOLE (acute kidney injury) [N17.9] 08/17/2020 Unknown    Weakness [R53.1] 08/17/2020 Unknown    NSTEMI (non-ST elevated myocardial infarction) [I21.4] 08/16/2020 Yes    Essential hypertension [I10] 11/24/2017 Yes     Chronic    Hypovolemia [E86.1] 06/19/2014 Yes      Problems Resolved During this Admission:    Diagnosis Date Noted Date Resolved POA    PRINCIPAL PROBLEM:  Syncope [R55] 08/16/2020 08/19/2020 Yes       Discharged Condition: fair    Disposition: Home or Self Care    Follow Up:  Follow-up Information     PCP In 1 week.               Patient Instructions:      HOSPITAL BED FOR HOME USE     Order Specific Question Answer Comments   Type: Semi-electric    Length of need (1-99 months): 99    Does patient have medical equipment at home? wheelchair    Does patient have medical equipment at home? walker, rolling    Does patient have medical equipment at home? bedside commode    Does patient have medical equipment at home? bath bench    Height: 5' 9" (1.753 m)    Weight: 57.7 kg (127 lb 1.6 oz)    Please check all that apply: Patient requires positioning of the body in ways not feasible in an ordinary bed due to a medical condition which is expected to last at least one month.    Please check all that apply: Patient requires, for the alleviation of pain, positioning of the body in ways not feasible in an ordinary bed.      Ambulatory referral/consult to Home Health   Standing Status: Future   Referral " Priority: Routine Referral Type: Home Health   Referral Reason: Specialty Services Required   Requested Specialty: Home Health Services   Number of Visits Requested: 1     Activity as tolerated       Significant Diagnostic Studies: Labs: All labs within the past 24 hours have been reviewed    Pending Diagnostic Studies:     Procedure Component Value Units Date/Time    Echo Color Flow Doppler? Yes [[id number]]     Order Status: Sent Lab Status: No result     Echo Color Flow Doppler? Yes [[id number]]     Order Status: Sent Lab Status: No result          Medications:  Reconciled Home Medications:      Medication List      START taking these medications    clopidogreL 75 mg tablet  Commonly known as: PLAVIX  Take 1 tablet (75 mg total) by mouth once daily.     metoprolol succinate 50 MG 24 hr tablet  Commonly known as: TOPROL-XL  Take 1 tablet (50 mg total) by mouth once daily.        CONTINUE taking these medications    * amLODIPine 5 MG tablet  Commonly known as: NORVASC  Take 2 tablets (10 mg total) by mouth once daily.     * amLODIPine 10 MG tablet  Commonly known as: NORVASC  Take 1 tablet by mouth once daily.     aspirin 81 MG EC tablet  Commonly known as: ECOTRIN  Take 1 tablet (81 mg total) by mouth once daily.     atorvastatin 40 MG tablet  Commonly known as: LIPITOR  Take 1 tablet (40 mg total) by mouth once daily.     * QUEtiapine 100 MG Tab  Commonly known as: SEROQUEL  Take 1 tablet by mouth once daily.     * QUEtiapine 200 MG Tab  Commonly known as: SEROQUEL  Take 1 tablet by mouth every evening.         * This list has 4 medication(s) that are the same as other medications prescribed for you. Read the directions carefully, and ask your doctor or other care provider to review them with you.                Indwelling Lines/Drains at time of discharge:   Lines/Drains/Airways     None                 Time spent on the discharge of patient: 60 minutes  Patient was seen and examined on the date of discharge and  determined to be suitable for discharge.         Michael Hsu Jr, MD  Department of Hospital Medicine  [facility]

## 2020-08-19 NOTE — PHYSICIAN QUERY
PT Name: Guido Michaels  MR #: [id number]    Nutrition Clarification     CDS/: Marichuy Singh RN              Contact information: [email or web address]    This form is a permanent document in the medical record.     Query withdrawn, patient discharged    Query Date: August 19, 2020    By submitting this query, we are merely seeking further clarification of documentation.. Please utilize your independent clinical judgment when addressing the question(s) below.    The medical record contains the following:   Indicators  Supporting Clinical Findings Location in Medical Record    % of Estimated Energy Intake over a time frame from p.o., TF, or TPN      Weight Status over a time frame      Subcutaneous Fat and/or Muscle Loss      Fluid Accumulation or Edema     x Wt/BMI/Usual Body Weight Weight: 57.7 kg (127 lb 1.6 oz)   Body mass index is 18.77 kg/m².    Progress Note 8/18       Hospital Medicine     Delayed Wound Healing/Failure to Thrive     x Acute or Chronic Illness * Syncope   Weakness   NICOLE   NSTEMI   Essential hypertension   Hypovolemia              Progress Note 8/18       Hospital Medicine           Medication      Treatment     x Other Positive for decreased appetite and weight loss.     Patient appears confused; emaciated; and dehydrated.         Thin, chronically ill appearing   Consult 8/16       Cardiology   Consult 8/16       Cardiology               Progress Note 8/18       Hospital Medicine             AND / ASPEN Clinical Characteristics (October 2011)  A minimum of two characteristics is recommended for diagnosing either moderate or severe malnutrition   Mild Malnutrition Moderate Malnutrition Severe Malnutrition   Energy Intake from p.o., TF or TPN. < 75% intake of estimated energy needs for less than 7 days < 75% intake of estimated energy needs for greater than 7 days < 50% intake of estimated energy needs for > 5 days   Weight Loss 1-2% in 1 month  5% in 3 months  7.5% in 6 months  10% in 1  year 1-2 % in 1 week  5% in 1 month  7.5% in 3 months  10% in 6 months  20% in 1 year > 2% in 1 week  > 5% in 1 month  > 7.5% in 3 months  > 10% in 6 months  > 20% in 1 year   Physical Findings     None *Mild subcutaneous fat and/or muscle loss  *Mild fluid accumulation  *Stage II decubitus  *Surgical wound or non-healing wound *Mod/severe subcutaneous fat and/or muscle loss  *Mod/severe fluid accumulation  *Stage III or IV decubitus  *Non-healing surgical wound     Provider, please specify diagnosis or diagnoses associated with above clinical findings.    [  ] Cachexia     [  ] Underweight     [  ] Other Nutritional Diagnosis (please specify): _______     [  ] Clinically Undetermined       Please document in your progress notes daily for the duration of treatment until resolved and include in your discharge summary.

## 2020-08-19 NOTE — ASSESSMENT & PLAN NOTE
Blood pressure appears mildly elevated.  Will continue to closely monitor especially in the setting of ACS.    Overall patient's blood pressure has stabilized.  Continue metoprolol per cardiology's recommendation after discharge.

## 2020-08-19 NOTE — PLAN OF CARE
08/19/20 0941   Medicare Message   Important Message from Medicare regarding Discharge Appeal Rights Given to patient/caregiver   Date IMM was signed 08/19/20   Time IMM was signed 0850     Second signature on IM obtained

## 2020-08-20 ENCOUNTER — HOSPITAL ENCOUNTER (EMERGENCY)
Facility: HOSPITAL | Age: 85
Discharge: HOME | End: 2020-08-20
Attending: EMERGENCY MEDICINE
Payer: MEDICARE

## 2020-08-20 VITALS
RESPIRATION RATE: 20 BRPM | SYSTOLIC BLOOD PRESSURE: 136 MMHG | HEART RATE: 87 BPM | DIASTOLIC BLOOD PRESSURE: 86 MMHG | TEMPERATURE: 98 F

## 2020-08-20 DIAGNOSIS — R32 URINARY INCONTINENCE, UNSPECIFIED TYPE: ICD-10-CM

## 2020-08-20 DIAGNOSIS — R40.4 TRANSIENT ALTERATION OF AWARENESS: Primary | ICD-10-CM

## 2020-08-20 LAB
ALBUMIN SERPL BCP-MCNC: 3 G/DL (ref 3.5–5.2)
ALP SERPL-CCNC: 52 U/L (ref 55–135)
ALT SERPL W/O P-5'-P-CCNC: 26 U/L (ref 10–44)
ANION GAP SERPL CALC-SCNC: 7 MMOL/L (ref 8–16)
AST SERPL-CCNC: 43 U/L (ref 10–40)
BASOPHILS # BLD AUTO: 0.02 K/UL (ref 0–0.2)
BASOPHILS NFR BLD: 0.3 % (ref 0–1.9)
BILIRUB SERPL-MCNC: 1.1 MG/DL (ref 0.1–1)
BILIRUB UR QL STRIP: NEGATIVE
BUN SERPL-MCNC: 20 MG/DL (ref 10–30)
CALCIUM SERPL-MCNC: 8.4 MG/DL (ref 8.7–10.5)
CHLORIDE SERPL-SCNC: 105 MMOL/L (ref 95–110)
CLARITY UR: CLEAR
CO2 SERPL-SCNC: 26 MMOL/L (ref 23–29)
COLOR UR: YELLOW
CREAT SERPL-MCNC: 1.5 MG/DL (ref 0.5–1.4)
DIFFERENTIAL METHOD: ABNORMAL
EOSINOPHIL # BLD AUTO: 0.1 K/UL (ref 0–0.5)
EOSINOPHIL NFR BLD: 1.7 % (ref 0–8)
ERYTHROCYTE [DISTWIDTH] IN BLOOD BY AUTOMATED COUNT: 12.7 % (ref 11.5–14.5)
EST. GFR  (AFRICAN AMERICAN): 46.4 ML/MIN/1.73 M^2
EST. GFR  (NON AFRICAN AMERICAN): 40.1 ML/MIN/1.73 M^2
GLUCOSE SERPL-MCNC: 113 MG/DL (ref 70–110)
GLUCOSE UR QL STRIP: NEGATIVE
HCT VFR BLD AUTO: 35.7 % (ref 40–54)
HGB BLD-MCNC: 11.7 G/DL (ref 14–18)
HGB UR QL STRIP: NEGATIVE
IMM GRANULOCYTES # BLD AUTO: 0.03 K/UL (ref 0–0.04)
IMM GRANULOCYTES NFR BLD AUTO: 0.4 % (ref 0–0.5)
KETONES UR QL STRIP: NEGATIVE
LEUKOCYTE ESTERASE UR QL STRIP: NEGATIVE
LYMPHOCYTES # BLD AUTO: 1.4 K/UL (ref 1–4.8)
LYMPHOCYTES NFR BLD: 18.3 % (ref 18–48)
MCH RBC QN AUTO: 31.5 PG (ref 27–31)
MCHC RBC AUTO-ENTMCNC: 32.8 G/DL (ref 32–36)
MCV RBC AUTO: 96 FL (ref 82–98)
MONOCYTES # BLD AUTO: 0.5 K/UL (ref 0.3–1)
MONOCYTES NFR BLD: 6.5 % (ref 4–15)
NEUTROPHILS # BLD AUTO: 5.5 K/UL (ref 1.8–7.7)
NEUTROPHILS NFR BLD: 72.8 % (ref 38–73)
NITRITE UR QL STRIP: NEGATIVE
NRBC BLD-RTO: 0 /100 WBC
PH UR STRIP: 5 [PH] (ref 5–8)
PLATELET # BLD AUTO: 171 K/UL (ref 150–350)
PMV BLD AUTO: 9.4 FL (ref 9.2–12.9)
POTASSIUM SERPL-SCNC: 3.8 MMOL/L (ref 3.5–5.1)
PROT SERPL-MCNC: 7.4 G/DL (ref 6–8.4)
PROT UR QL STRIP: ABNORMAL
RBC # BLD AUTO: 3.72 M/UL (ref 4.6–6.2)
SODIUM SERPL-SCNC: 138 MMOL/L (ref 136–145)
SP GR UR STRIP: 1.01 (ref 1–1.03)
URN SPEC COLLECT METH UR: ABNORMAL
UROBILINOGEN UR STRIP-ACNC: 1 EU/DL
WBC # BLD AUTO: 7.59 K/UL (ref 3.9–12.7)

## 2020-08-20 PROCEDURE — P9612 CATHETERIZE FOR URINE SPEC: HCPCS

## 2020-08-20 PROCEDURE — 96365 THER/PROPH/DIAG IV INF INIT: CPT

## 2020-08-20 PROCEDURE — 63600175 PHARM REV CODE 636 W HCPCS: Performed by: EMERGENCY MEDICINE

## 2020-08-20 PROCEDURE — 99284 EMERGENCY DEPT VISIT MOD MDM: CPT | Mod: 25

## 2020-08-20 PROCEDURE — 36415 COLL VENOUS BLD VENIPUNCTURE: CPT

## 2020-08-20 PROCEDURE — 81003 URINALYSIS AUTO W/O SCOPE: CPT

## 2020-08-20 PROCEDURE — 25000003 PHARM REV CODE 250: Performed by: EMERGENCY MEDICINE

## 2020-08-20 PROCEDURE — 85025 COMPLETE CBC W/AUTO DIFF WBC: CPT

## 2020-08-20 PROCEDURE — 80053 COMPREHEN METABOLIC PANEL: CPT

## 2020-08-20 RX ORDER — LEVETIRACETAM 10 MG/ML
1000 INJECTION INTRAVASCULAR
Status: COMPLETED | OUTPATIENT
Start: 2020-08-20 | End: 2020-08-20

## 2020-08-20 RX ORDER — LEVETIRACETAM 500 MG/1
500 TABLET ORAL DAILY
Qty: 30 TABLET | Refills: 0 | Status: SHIPPED | OUTPATIENT
Start: 2020-08-20 | End: 2020-09-19

## 2020-08-20 RX ADMIN — SODIUM CHLORIDE 500 ML: 0.9 INJECTION, SOLUTION INTRAVENOUS at 11:08

## 2020-08-20 RX ADMIN — LEVETIRACETAM 1000 MG: 1000 INJECTION, SOLUTION INTRAVENOUS at 11:08

## 2020-08-20 NOTE — ED PROVIDER NOTES
Encounter Date: 8/20/2020       History     Chief Complaint   Patient presents with    Altered Mental Status     per EMS family states recent admission for heart attack.  Family fed him and he was talking then he was breathing funny and drooling.  He urinated on himself.  .      89 yo male with history of dementia, CVA, here for recurrent alteration of consciousness. Similar presentation a few days ago led to several days of observation in the hospital without any significant events and the patient was discharged home. Today patient had another episode of altered mentation, where he went limp and lost continence. Here he is improved. He is unable to contribute to history due to dementia. No fever or recent known sick contacts.         Review of patient's allergies indicates:  No Known Allergies  Past Medical History:   Diagnosis Date    Dementia     Diastolic dysfunction without heart failure 2014    History of ischemic stroke without residual deficits 2014    s/p tPA therapy    Hyperlipidemia     Hypertension      Past Surgical History:   Procedure Laterality Date    APPENDECTOMY      HERNIA REPAIR       History reviewed. No pertinent family history.  Social History     Tobacco Use    Smoking status: Never Smoker    Smokeless tobacco: Never Used   Substance Use Topics    Alcohol use: No    Drug use: No     Review of Systems   Unable to perform ROS: Dementia       Physical Exam     Initial Vitals [08/20/20 1144]   BP Pulse Resp Temp SpO2   104/71 63 18 97.8 °F (36.6 °C) --      MAP       --         Physical Exam    Nursing note and vitals reviewed.  Constitutional: He is not diaphoretic. No distress.   HENT:   Head: Normocephalic and atraumatic.   Eyes: Conjunctivae are normal. Right eye exhibits no discharge. Left eye exhibits no discharge. No scleral icterus.   Neck: Neck supple. No JVD present.   Cardiovascular: Normal rate, regular rhythm and intact distal pulses.   Pulmonary/Chest: Breath  sounds normal. No stridor. No respiratory distress. He has no wheezes. He has no rales.   Abdominal: Soft. Bowel sounds are normal. He exhibits no distension and no mass. There is no abdominal tenderness.   Musculoskeletal: No tenderness or edema.   Neurological: He is alert.   Disoriented, Moves all 4. Sensation appears grossly intact.    Skin: Skin is warm. Capillary refill takes less than 2 seconds. No rash noted. No erythema.         ED Course   Procedures  Labs Reviewed   CBC W/ AUTO DIFFERENTIAL - Abnormal; Notable for the following components:       Result Value    RBC 3.72 (*)     Hemoglobin 11.7 (*)     Hematocrit 35.7 (*)     Mean Corpuscular Hemoglobin 31.5 (*)     All other components within normal limits   COMPREHENSIVE METABOLIC PANEL - Abnormal; Notable for the following components:    Glucose 113 (*)     Creatinine 1.5 (*)     Calcium 8.4 (*)     Albumin 3.0 (*)     Total Bilirubin 1.1 (*)     Alkaline Phosphatase 52 (*)     AST 43 (*)     Anion Gap 7 (*)     eGFR if  46.4 (*)     eGFR if non  40.1 (*)     All other components within normal limits   URINALYSIS, REFLEX TO URINE CULTURE - Abnormal; Notable for the following components:    Protein, UA Trace (*)     All other components within normal limits    Narrative:     Specimen Source->Urine          Imaging Results    None          Medical Decision Making:   Clinical Tests:   Lab Tests: Ordered and Reviewed  ED Management:  I suspect seizure, given history. CT head within the week with no acute findings. Will start keppra 500mg daily and have f/u with PCP, neurology.                                  Clinical Impression:       ICD-10-CM ICD-9-CM   1. Transient alteration of awareness  R40.4 780.02   2. Urinary incontinence, unspecified type  R32 788.30         Disposition:   Disposition: Discharged  Condition: Fair     ED Disposition Condition    Discharge Stable        ED Prescriptions     Medication Sig Dispense  Start Date End Date Auth. Provider    levETIRAcetam (KEPPRA) 500 MG Tab Take 1 tablet (500 mg total) by mouth once daily. 30 tablet 8/20/2020 9/19/2020 Sha Medrano MD        Follow-up Information    None                                    Sha Medrano MD  08/20/20 9232

## 2023-03-02 NOTE — H&P
"[facility] Medicine  History & Physical    Patient Name: Guido Michaels  MRN: [id number]  Admission Date: 8/15/2020  Attending Physician: Michael Hsu Jr, MD  Primary Care Provider: Vinicio Walter MD         Patient information was obtained from ER records.     Subjective:     Principal Problem:Syncope    Chief Complaint:   Chief Complaint   Patient presents with    Loss of Consciousness     Family reports they were assisting the patient to bed when he "went limp". Family reprots pt is back to baseline. They report he has hx of stroke and dementia. Pt denies pain/complaints at this time. EMS reports pt was hypotensive in route.         HPI:   Chief Complaint   Patient presents with    Loss of Consciousness       Family reports they were assisting the patient to bed when he "went limp". Family reprots pt is back to baseline. They report he has hx of stroke and dementia. Pt denies pain/complaints at this time. EMS reports pt was hypotensive in route.      Pt is a 89 y/o M who is a poor historian secondary to dementia who presents with concern for a brief episode of unresponsiveness at home.  EMS reported that the family called as he was weak and went limp for several seconds as the family was attempting to put him in bed. Pt was back to baseline mental status upon EMS arrival according to the family.        Past Medical History:   Diagnosis Date    Dementia     Diastolic dysfunction without heart failure 2014    History of ischemic stroke without residual deficits 2014    s/p tPA therapy    Hyperlipidemia     Hypertension        Past Surgical History:   Procedure Laterality Date    APPENDECTOMY      HERNIA REPAIR         Review of patient's allergies indicates:  No Known Allergies    No current facility-administered medications on file prior to encounter.      Current Outpatient Medications on File Prior to Encounter   Medication Sig    amLODIPine (NORVASC) 10 MG tablet Take 1 tablet by " mouth once daily.    QUEtiapine (SEROQUEL) 100 MG Tab Take 1 tablet by mouth once daily.    QUEtiapine (SEROQUEL) 200 MG Tab Take 1 tablet by mouth every evening.    aspirin (ECOTRIN) 81 MG EC tablet Take 1 tablet (81 mg total) by mouth once daily.    atorvastatin (LIPITOR) 40 MG tablet Take 1 tablet (40 mg total) by mouth once daily.     Family History     None        Tobacco Use    Smoking status: Never Smoker    Smokeless tobacco: Never Used   Substance and Sexual Activity    Alcohol use: No    Drug use: No    Sexual activity: Not on file     Review of Systems   Constitutional: Negative for chills and fever.   HENT: Negative for rhinorrhea, sneezing and sore throat.    Eyes: Negative for pain and visual disturbance.   Respiratory: Negative for cough and shortness of breath.    Cardiovascular: Negative for chest pain.   Gastrointestinal: Negative for abdominal pain, constipation and diarrhea.   Endocrine: Negative for cold intolerance and heat intolerance.   Genitourinary: Negative for difficulty urinating.   Musculoskeletal: Negative for arthralgias and joint swelling.   Skin: Negative for rash.   Allergic/Immunologic: Negative for environmental allergies.   Neurological: Negative for dizziness, syncope and weakness.   Hematological: Does not bruise/bleed easily.   Psychiatric/Behavioral: Positive for confusion. Negative for dysphoric mood. The patient is not nervous/anxious.      Objective:     Vital Signs (Most Recent):  Temp: 98.2 °F (36.8 °C) (08/17/20 1102)  Pulse: 65 (08/17/20 1102)  Resp: 20 (08/17/20 1102)  BP: 117/70 (08/17/20 1102)  SpO2: 96 % (08/17/20 1102) Vital Signs (24h Range):  Temp:  [97.7 °F (36.5 °C)-98.2 °F (36.8 °C)] 98.2 °F (36.8 °C)  Pulse:  [65-97] 65  Resp:  [20-22] 20  SpO2:  [92 %-100 %] 96 %  BP: (117-153)/() 117/70     Weight: 57.7 kg (127 lb 1.6 oz)  Body mass index is 18.77 kg/m².    Physical Exam  Vitals signs and nursing note reviewed.   Constitutional:        Appearance: Normal appearance.      Comments: Thin, chronically ill appearing.   HENT:      Head: Normocephalic and atraumatic.      Nose: Nose normal.   Eyes:      Extraocular Movements: Extraocular movements intact.      Pupils: Pupils are equal, round, and reactive to light.   Neck:      Musculoskeletal: Normal range of motion and neck supple.   Cardiovascular:      Rate and Rhythm: Normal rate and regular rhythm.      Heart sounds: No murmur. No friction rub. No gallop.    Pulmonary:      Effort: Pulmonary effort is normal.      Breath sounds: Normal breath sounds.   Abdominal:      General: Abdomen is flat. Bowel sounds are normal.      Palpations: Abdomen is soft.   Musculoskeletal:         General: No swelling or deformity.   Skin:     General: Skin is warm and dry.      Capillary Refill: Capillary refill takes less than 2 seconds.   Neurological:      General: No focal deficit present.      Mental Status: He is alert and oriented to person, place, and time.   Psychiatric:      Comments: Dementia noted           CRANIAL NERVES     CN III, IV, VI   Pupils are equal, round, and reactive to light.       Lab Results   Component Value Date    WBC 7.40 08/17/2020    RBC 3.84 (L) 08/17/2020    HGB 12.2 (L) 08/17/2020    HCT 37.2 (L) 08/17/2020    MCV 97 08/17/2020    MCH 31.8 (H) 08/17/2020    MCHC 32.8 08/17/2020    RDW 12.9 08/17/2020     08/17/2020    MPV 9.9 08/17/2020    GRAN 5.0 08/17/2020    GRAN 67.1 08/17/2020    LYMPH 1.6 08/17/2020    LYMPH 21.4 08/17/2020    MONO 0.6 08/17/2020    MONO 8.1 08/17/2020    EOS 0.2 08/17/2020    BASO 0.05 08/17/2020    EOSINOPHIL 2.4 08/17/2020    BASOPHIL 0.7 08/17/2020    MG 2.6 08/15/2020        CMP  Lab Results   Component Value Date     08/17/2020    K 3.8 08/17/2020     08/17/2020    CO2 26 08/17/2020    GLU 62 (L) 08/17/2020    BUN 26 08/17/2020    CREATININE 1.3 08/17/2020    CALCIUM 8.3 (L) 08/17/2020    PROT 7.7 08/17/2020    ALBUMIN 3.0 (L)  08/17/2020    BILITOT 1.2 (H) 08/17/2020    ALKPHOS 44 (L) 08/17/2020    AST 71 (H) 08/17/2020    ALT 27 08/17/2020    ANIONGAP 7 (L) 08/17/2020    ESTGFRAFRICA 55.1 (A) 08/17/2020    EGFRNONAA 47.7 (A) 08/17/2020        Lab Results   Component Value Date    LABBLOO No growth after 5 days. 11/24/2017    LABURIN No growth 11/24/2017        Imaging Results          CT Head Without Contrast (Final result)  Result time 08/16/20 10:10:12    Final result by Jaycee Mosley MD (08/16/20 10:10:12)                 Impression:      No acute intracranial abnormalities.  No significant change since November 2017    Severe chronic white matter disease, most likely from small vessel ischemic changes      Electronically signed by: Jaycee Mosley MD  Date:    08/16/2020  Time:    10:10             Narrative:    EXAMINATION:  CT HEAD WITHOUT CONTRAST    CLINICAL HISTORY:  Altered mental status;    CT/Cardiac Nuclear exams in prior 12 months: 0    TECHNIQUE:  Axial head CT performed without IV contrast.  Iterative reconstruction utilized.    COMPARISON:  CT head 11/24/2017    FINDINGS:  No intracranial hemorrhage, acute infarction or mass effect detected.    Diffuse confluent decreased white matter attenuation, supra and periventricular regions.  Mild cerebral atrophy, not advanced for age                               X-Ray Chest AP Portable (Final result)  Result time 08/16/20 11:30:20    Final result by Jaycee Mosley MD (08/16/20 11:30:20)                 Impression:      No acute findings      Electronically signed by: Jaycee Mosley MD  Date:    08/16/2020  Time:    11:30             Narrative:    EXAMINATION:  XR CHEST AP PORTABLE    CLINICAL HISTORY:  Chest Pain;    COMPARISON:  11/24/2017    FINDINGS:  No acute infiltrates.  Normal size cardiac silhouette.  No vascular congestion or pleural fluid                                  Significant Labs: All pertinent labs within the past 24 hours have been  Mel reviewed.    Significant Imaging: I have reviewed and interpreted all pertinent imaging results/findings within the past 24 hours.    Assessment/Plan:     * Syncope  No more recurrences continue telemetry monitoring.      NICOLE (acute kidney injury)  BMP reviewed- noted Estimated Creatinine Clearance: 30.2 mL/min (based on SCr of 1.3 mg/dL). according to latest data. Monitor UOP and serial BMP and adjust therapy as needed. Renally dose meds.    BMP  Lab Results   Component Value Date     08/17/2020    K 3.8 08/17/2020     08/17/2020    CO2 26 08/17/2020    BUN 26 08/17/2020    CREATININE 1.3 08/17/2020    CALCIUM 8.3 (L) 08/17/2020    ANIONGAP 7 (L) 08/17/2020    ESTGFRAFRICA 55.1 (A) 08/17/2020    EGFRNONAA 47.7 (A) 08/17/2020       Lab Results   Component Value Date    CREATININE 1.3 08/17/2020    CREATININE 2.3 (H) 08/15/2020    CREATININE 1.7 (H) 07/29/2020      Continue gentle volume resuscitation.  Patient appears to be nearing his      NSTEMI (non-ST elevated myocardial infarction)  The patient resents with an acute coronary syndrome:  NSTEMI.  We will pursue treatment per ACS protocol.    Recent Labs   Lab 08/15/20  2314 08/16/20  0115 08/16/20  0845   TROPONINI 0.069* 0.222* 0.527*        Results for orders placed or performed during the hospital encounter of 08/15/20   EKG 12-lead    Collection Time: 08/16/20 10:43 AM    Narrative    Test Reason : I21.4,    Vent. Rate : 068 BPM     Atrial Rate : 068 BPM     P-R Int : 276 ms          QRS Dur : 082 ms      QT Int : 438 ms       P-R-T Axes : 062 -55 076 degrees     QTc Int : 465 ms    Sinus rhythm with 1st degree A-V block  Left anterior fascicular block  Cannot exclude prior  Septal infarct (cited on or before 24-NOV-2017)  Abnormal ECG  When compared with ECG of 15-AUG-2020 23:08,  Premature atrial complexes are no longer Present  Vent. rate has decreased BY  43 BPM  Confirmed by Shorty ARIAS MD ([id number]) on 8/16/2020 1:22:16 PM    Referred By:  [email or web address]   SELF           Confirmed By:Shorty ARIAS MD      Will consult cardiology for further eval        Essential hypertension  Blood pressure appears mildly elevated.  Will continue to closely monitor especially in the setting of ACS.      Hypovolemia  Resolving nicely with volume resuscitation        VTE Risk Mitigation (From admission, onward)         Ordered     enoxaparin injection 30 mg  Every 24 hours      08/16/20 0844     IP VTE HIGH RISK PATIENT  Once      08/16/20 0749     Place sequential compression device  Until discontinued      08/16/20 0749     Place MARIELA hose  Until discontinued      08/16/20 0749                   Michael Hsu Jr, MD  Department of Hospital Medicine   [facility]